# Patient Record
Sex: MALE | Race: WHITE
[De-identification: names, ages, dates, MRNs, and addresses within clinical notes are randomized per-mention and may not be internally consistent; named-entity substitution may affect disease eponyms.]

---

## 2019-03-28 ENCOUNTER — HOSPITAL ENCOUNTER (OUTPATIENT)
Dept: HOSPITAL 25 - ED | Age: 64
Setting detail: OBSERVATION
LOS: 6 days | Discharge: HOME | DRG: 988 | End: 2019-04-03
Attending: INTERNAL MEDICINE | Admitting: HOSPITALIST
Payer: MEDICARE

## 2019-03-28 DIAGNOSIS — Z87.891: ICD-10-CM

## 2019-03-28 DIAGNOSIS — R04.2: ICD-10-CM

## 2019-03-28 DIAGNOSIS — I10: ICD-10-CM

## 2019-03-28 DIAGNOSIS — Z90.49: ICD-10-CM

## 2019-03-28 DIAGNOSIS — E11.22: ICD-10-CM

## 2019-03-28 DIAGNOSIS — T50.8X5A: ICD-10-CM

## 2019-03-28 DIAGNOSIS — M31.31: ICD-10-CM

## 2019-03-28 DIAGNOSIS — M31.0: Primary | ICD-10-CM

## 2019-03-28 DIAGNOSIS — E78.5: ICD-10-CM

## 2019-03-28 DIAGNOSIS — K74.60: ICD-10-CM

## 2019-03-28 DIAGNOSIS — I25.2: ICD-10-CM

## 2019-03-28 DIAGNOSIS — M10.9: ICD-10-CM

## 2019-03-28 DIAGNOSIS — Z72.89: ICD-10-CM

## 2019-03-28 DIAGNOSIS — Z81.8: ICD-10-CM

## 2019-03-28 DIAGNOSIS — I25.10: ICD-10-CM

## 2019-03-28 DIAGNOSIS — R91.8: ICD-10-CM

## 2019-03-28 DIAGNOSIS — E11.42: ICD-10-CM

## 2019-03-28 DIAGNOSIS — I12.9: ICD-10-CM

## 2019-03-28 DIAGNOSIS — N18.9: ICD-10-CM

## 2019-03-28 DIAGNOSIS — Z79.4: ICD-10-CM

## 2019-03-28 DIAGNOSIS — E66.01: ICD-10-CM

## 2019-03-28 DIAGNOSIS — E78.00: ICD-10-CM

## 2019-03-28 DIAGNOSIS — Z95.5: ICD-10-CM
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
## 2019-03-28 DIAGNOSIS — Z82.49: ICD-10-CM

## 2019-03-28 DIAGNOSIS — Z88.8: ICD-10-CM

## 2019-03-28 LAB
ALBUMIN SERPL BCG-MCNC: 4.1 G/DL (ref 3.2–5.2)
ALBUMIN/GLOB SERPL: 1.4 {RATIO} (ref 1–3)
ALP SERPL-CCNC: 26 U/L (ref 34–104)
ALT SERPL W P-5'-P-CCNC: 35 U/L (ref 7–52)
ANION GAP SERPL CALC-SCNC: 9 MMOL/L (ref 2–11)
APAP SERPL-MCNC: < 15 MCG/ML
AST SERPL-CCNC: 51 U/L (ref 13–39)
BASOPHILS # BLD AUTO: 0 10^3/UL (ref 0–0.2)
BUN SERPL-MCNC: 53 MG/DL (ref 6–24)
BUN/CREAT SERPL: 14.5 (ref 8–20)
CALCIUM SERPL-MCNC: 9.3 MG/DL (ref 8.6–10.3)
CHLORIDE SERPL-SCNC: 109 MMOL/L (ref 101–111)
CK SERPL-CCNC: 213 U/L (ref 10–223)
EOSINOPHIL # BLD AUTO: 0.1 10^3/UL (ref 0–0.6)
GLOBULIN SER CALC-MCNC: 2.9 G/DL (ref 2–4)
GLUCOSE SERPL-MCNC: 142 MG/DL (ref 70–100)
HCO3 SERPL-SCNC: 23 MMOL/L (ref 22–32)
HCT VFR BLD AUTO: 41 % (ref 36–46)
HGB BLD-MCNC: 14.1 G/DL (ref 14–18)
INR PPP/BLD: 1.08 (ref 0.77–1.02)
LDH SERPL L TO P-CCNC: 222 U/L (ref 140–271)
LYMPHOCYTES # BLD AUTO: 0.9 10^3/UL (ref 1–4.8)
MAGNESIUM SERPL-MCNC: 1.7 MG/DL (ref 1.9–2.7)
MCH RBC QN AUTO: 32 PG (ref 27–31)
MCHC RBC AUTO-ENTMCNC: 34 G/DL (ref 31–36)
MCV RBC AUTO: 93 FL (ref 80–94)
MONOCYTES # BLD AUTO: 0.8 10^3/UL (ref 0–0.8)
NEUTROPHILS # BLD AUTO: 3.5 10^3/UL (ref 1.5–7.7)
NRBC # BLD AUTO: 0 10^3/UL
NRBC BLD QL AUTO: 0.2
PLATELET # BLD AUTO: 149 10^3/UL (ref 150–450)
POTASSIUM SERPL-SCNC: 3.9 MMOL/L (ref 3.5–5)
PROT SERPL-MCNC: 7 G/DL (ref 6.4–8.9)
RBC # BLD AUTO: 4.46 10^6 /UL (ref 4.18–5.48)
SALICYLATES SERPL-MCNC: < 2.5 MG/DL (ref ?–30)
SODIUM SERPL-SCNC: 141 MMOL/L (ref 135–145)
TROPONIN I SERPL-MCNC: 0.02 NG/ML (ref ?–0.04)
TSH SERPL-ACNC: 1.61 MCIU/ML (ref 0.34–5.6)
WBC # BLD AUTO: 5.4 10^3/UL (ref 3.5–10.8)
WBC UR QL AUTO: (no result)

## 2019-03-28 PROCEDURE — 71046 X-RAY EXAM CHEST 2 VIEWS: CPT

## 2019-03-28 PROCEDURE — 88305 TISSUE EXAM BY PATHOLOGIST: CPT

## 2019-03-28 PROCEDURE — 82785 ASSAY OF IGE: CPT

## 2019-03-28 PROCEDURE — 87116 MYCOBACTERIA CULTURE: CPT

## 2019-03-28 PROCEDURE — 81003 URINALYSIS AUTO W/O SCOPE: CPT

## 2019-03-28 PROCEDURE — 80307 DRUG TEST PRSMV CHEM ANLYZR: CPT

## 2019-03-28 PROCEDURE — 87070 CULTURE OTHR SPECIMN AEROBIC: CPT

## 2019-03-28 PROCEDURE — 86803 HEPATITIS C AB TEST: CPT

## 2019-03-28 PROCEDURE — 81015 MICROSCOPIC EXAM OF URINE: CPT

## 2019-03-28 PROCEDURE — 71045 X-RAY EXAM CHEST 1 VIEW: CPT

## 2019-03-28 PROCEDURE — 82570 ASSAY OF URINE CREATININE: CPT

## 2019-03-28 PROCEDURE — 76000 FLUOROSCOPY <1 HR PHYS/QHP: CPT

## 2019-03-28 PROCEDURE — 76775 US EXAM ABDO BACK WALL LIM: CPT

## 2019-03-28 PROCEDURE — 82164 ANGIOTENSIN I ENZYME TEST: CPT

## 2019-03-28 PROCEDURE — 84156 ASSAY OF PROTEIN URINE: CPT

## 2019-03-28 PROCEDURE — 83615 LACTATE (LD) (LDH) ENZYME: CPT

## 2019-03-28 PROCEDURE — 84443 ASSAY THYROID STIM HORMONE: CPT

## 2019-03-28 PROCEDURE — 93005 ELECTROCARDIOGRAM TRACING: CPT

## 2019-03-28 PROCEDURE — 85610 PROTHROMBIN TIME: CPT

## 2019-03-28 PROCEDURE — 80053 COMPREHEN METABOLIC PANEL: CPT

## 2019-03-28 PROCEDURE — 84540 ASSAY OF URINE/UREA-N: CPT

## 2019-03-28 PROCEDURE — 84165 PROTEIN E-PHORESIS SERUM: CPT

## 2019-03-28 PROCEDURE — 83516 IMMUNOASSAY NONANTIBODY: CPT

## 2019-03-28 PROCEDURE — 86225 DNA ANTIBODY NATIVE: CPT

## 2019-03-28 PROCEDURE — 83880 ASSAY OF NATRIURETIC PEPTIDE: CPT

## 2019-03-28 PROCEDURE — 86431 RHEUMATOID FACTOR QUANT: CPT

## 2019-03-28 PROCEDURE — 83883 ASSAY NEPHELOMETRY NOT SPEC: CPT

## 2019-03-28 PROCEDURE — 89190 NASAL SMEAR FOR EOSINOPHILS: CPT

## 2019-03-28 PROCEDURE — 87077 CULTURE AEROBIC IDENTIFY: CPT

## 2019-03-28 PROCEDURE — G0480 DRUG TEST DEF 1-7 CLASSES: HCPCS

## 2019-03-28 PROCEDURE — 36415 COLL VENOUS BLD VENIPUNCTURE: CPT

## 2019-03-28 PROCEDURE — 87015 SPECIMEN INFECT AGNT CONCNTJ: CPT

## 2019-03-28 PROCEDURE — 83735 ASSAY OF MAGNESIUM: CPT

## 2019-03-28 PROCEDURE — 87340 HEPATITIS B SURFACE AG IA: CPT

## 2019-03-28 PROCEDURE — 87086 URINE CULTURE/COLONY COUNT: CPT

## 2019-03-28 PROCEDURE — 84166 PROTEIN E-PHORESIS/URINE/CSF: CPT

## 2019-03-28 PROCEDURE — 94640 AIRWAY INHALATION TREATMENT: CPT

## 2019-03-28 PROCEDURE — 99284 EMERGENCY DEPT VISIT MOD MDM: CPT

## 2019-03-28 PROCEDURE — 83520 IMMUNOASSAY QUANT NOS NONAB: CPT

## 2019-03-28 PROCEDURE — 71250 CT THORAX DX C-: CPT

## 2019-03-28 PROCEDURE — 80076 HEPATIC FUNCTION PANEL: CPT

## 2019-03-28 PROCEDURE — 84300 ASSAY OF URINE SODIUM: CPT

## 2019-03-28 PROCEDURE — 82043 UR ALBUMIN QUANTITATIVE: CPT

## 2019-03-28 PROCEDURE — 80329 ANALGESICS NON-OPIOID 1 OR 2: CPT

## 2019-03-28 PROCEDURE — 88112 CYTOPATH CELL ENHANCE TECH: CPT

## 2019-03-28 PROCEDURE — 86335 IMMUNFIX E-PHORSIS/URINE/CSF: CPT

## 2019-03-28 PROCEDURE — 84155 ASSAY OF PROTEIN SERUM: CPT

## 2019-03-28 PROCEDURE — 84484 ASSAY OF TROPONIN QUANT: CPT

## 2019-03-28 PROCEDURE — 80048 BASIC METABOLIC PNL TOTAL CA: CPT

## 2019-03-28 PROCEDURE — 82550 ASSAY OF CK (CPK): CPT

## 2019-03-28 PROCEDURE — 87205 SMEAR GRAM STAIN: CPT

## 2019-03-28 PROCEDURE — 87206 SMEAR FLUORESCENT/ACID STAI: CPT

## 2019-03-28 PROCEDURE — 86140 C-REACTIVE PROTEIN: CPT

## 2019-03-28 PROCEDURE — 87102 FUNGUS ISOLATION CULTURE: CPT

## 2019-03-28 PROCEDURE — 83605 ASSAY OF LACTIC ACID: CPT

## 2019-03-28 PROCEDURE — 82595 ASSAY OF CRYOGLOBULIN: CPT

## 2019-03-28 PROCEDURE — 86038 ANTINUCLEAR ANTIBODIES: CPT

## 2019-03-28 PROCEDURE — 87186 SC STD MICRODIL/AGAR DIL: CPT

## 2019-03-28 PROCEDURE — 85652 RBC SED RATE AUTOMATED: CPT

## 2019-03-28 PROCEDURE — G0378 HOSPITAL OBSERVATION PER HR: HCPCS

## 2019-03-28 PROCEDURE — 80320 DRUG SCREEN QUANTALCOHOLS: CPT

## 2019-03-28 PROCEDURE — 82784 ASSAY IGA/IGD/IGG/IGM EACH: CPT

## 2019-03-28 PROCEDURE — 85025 COMPLETE CBC W/AUTO DIFF WBC: CPT

## 2019-03-28 NOTE — ED
Shortness of Breath





- HPI Summary


HPI Summary: 





Patient complains of intermittent mild SOB with exertion, moderate productive 

cough with some blood in it intermittently x 2 days.  Also complains of 

increased urinary frequency, with decreased volume starting today.  Patient 

also complains of one episode of minor chest pain 2 days ago with no subsequent 

chest pain since.  No active CP, SOB here in the ED.  Denies fever, sore throat

, HA, neck stiffness, N/V/D, abdominal pain, change in BM.  Medical history is 

HTN, HDL, cirrhosis, cardiac stents placed in 2005, DM.  Former smoker, quit 19 

years ago.  History of negative nuclear stress test yesterday for abnormal EKG.

  No anti-coag. 





- History of Current Complaint


Chief Complaint: EDShortnessOfBreath


Time Seen by Provider: 03/28/19 18:06


Hx Obtained From: Patient


Onset/Duration: Gradual Onset


Timing: Constant


Current Severity: Mild


Dyspnea At: Exertion


Associated Signs & Symptoms: Cough (Productive), Cough (Bloody Sputum)





- Allergy/Home Medications


Allergies/Adverse Reactions: 


 Allergies











Allergy/AdvReac Type Severity Reaction Status Date / Time


 


lisinopril AdvReac Intermediate Dizziness Verified 03/27/19 13:33














PMH/Surg Hx/FS Hx/Imm Hx


Endocrine/Hematology History: Reports: Hx Diabetes


Cardiovascular History: Reports: Hx Angina, Hx Coronary Artery Disease, Hx 

Hypercholesterolemia, Hx Hypertension, Hx Myocardial Infarction


   Denies: Hx Pacemaker/ICD, Hx Valvular Heart Disease


Respiratory History: 


   Denies: Hx Asthma, Hx Chronic Obstructive Pulmonary Disease (COPD)


 History: 


   Denies: Hx Chronic Renal Failure, Hx Renal Disease


Sensory History: Reports: Hx Contacts or Glasses - GLASSES


   Denies: Hx Hearing Aid


Opthamlomology History: Reports: Hx Contacts or Glasses - GLASSES


Neurological History: Reports: Other Neuro Impairments/Disorders - PERIPHERAL 

NEUROPATHY FEET


Psychiatric History: 


   Denies: Hx Panic Disorder





- Cancer History


Cancer Type, Location and Year: basal cell CHEST removed,


Hx Chemotherapy: No


Hx Radiation Therapy: No





- Surgical History


Surgery Procedure, Year, and Place: hernia repair, cardiac stent-CORDIS CONSTANCE 

2005: 3T SAFE, BONE RESET, VASECTOMY


Hx Anesthesia Reactions: No





- Immunization History


Date of Influenza Vaccine: 09/2018


Immunizations Up to Date: Yes


Infectious Disease History: No


Infectious Disease History: 


   Denies: Traveled Outside the [place] in Last 30 Days





- Social History


Alcohol Use: Weekly


Substance Use Type: Reports: None


Substance Use Comment - Amount & Last Used: QUIT DRINKING 1/11/16


Smoking Status (MU): Former Smoker


Length of Time of Smoking/Using Tobacco: 30 YRS


Have You Smoked in the Last Year: No





Review of Systems


Constitutional: Negative


Eyes: Negative


ENT: Negative


Positive: Chest Pain


Positive: Shortness Of Breath, Cough


Gastrointestinal: Negative


Positive: frequency


Musculoskeletal: Negative


Skin: Negative


Neurological: Negative


Psychological: Normal


All Other Systems Reviewed And Are Negative: Yes





Physical Exam


Triage Information Reviewed: Yes


Vital Signs On Initial Exam: 


 Initial Vitals











Temp Pulse Resp BP Pulse Ox


 


 99.1 F   93   20   148/86   93 


 


 03/28/19 17:03  03/28/19 17:03  03/28/19 17:03  03/28/19 17:03  03/28/19 17:03











Vital Signs Reviewed: Yes


Appearance: Positive: Well-Appearing


Skin: Positive: Warm


Head/Face: Positive: Normal Head/Face Inspection


Eyes: Positive: Normal


Neck: Positive: Supple


Respiratory/Lung Sounds: Positive: Clear to Auscultation


Cardiovascular: Positive: Normal


Abdomen Description: Positive: Nontender


Musculoskeletal: Positive: Normal


Neurological: Positive: Normal


Psychiatric: Positive: Normal


AVPU Assessment: Alert





- Konrad Coma Scale


Best Eye Response: 4 - Spontaneous


Best Motor Response: 6 - Obeys Commands


Best Verbal Response: 5 - Oriented


Coma Scale Total: 15





Diagnostics





- Vital Signs


 Vital Signs











  Temp Pulse Resp BP Pulse Ox


 


 03/28/19 18:18   87  21  158/94  94


 


 03/28/19 17:48   87  20  163/94  90


 


 03/28/19 17:44   85  21   92


 


 03/28/19 17:03  99.1 F  93  20  148/86  93














- Laboratory


Lab Results: 


 Lab Results











  03/28/19 03/28/19 03/28/19 Range/Units





  18:28 18:28 18:28 


 


WBC  5.4    (3.5-10.8)  10^3/uL


 


RBC  4.46    (4.18-5.48)  10^6 /uL


 


Hgb  14.1    (14.0-18.0)  g/dL


 


Hct  41    (36-46)  %


 


MCV  93    (80-94)  fL


 


MCH  32 H    (27-31)  pg


 


MCHC  34    (31-36)  g/dL


 


RDW  15    (10.5-15)  %


 


Plt Count  149 L    (150-450)  10^3/uL


 


MPV  9.3    (7.4-10.4)  fL


 


Neut % (Auto)  65.8    %


 


Lymph % (Auto)  17.3    %


 


Mono % (Auto)  15.1    %


 


Eos % (Auto)  1.1    %


 


Baso % (Auto)  0.7    %


 


Absolute Neuts (auto)  3.5    (1.5-7.7)  10^3/ul


 


Absolute Lymphs (auto)  0.9 L    (1.0-4.8)  10^3/ul


 


Absolute Monos (auto)  0.8    (0-0.8)  10^3/ul


 


Absolute Eos (auto)  0.1    (0-0.6)  10^3/ul


 


Absolute Basos (auto)  0    (0-0.2)  10^3/ul


 


Absolute Nucleated RBC  0    10^3/ul


 


Nucleated RBC %  0.2    


 


INR (Anticoag Therapy)     (0.77-1.02)  


 


Sodium   141   (135-145)  mmol/L


 


Potassium   3.9   (3.5-5.0)  mmol/L


 


Chloride   109   (101-111)  mmol/L


 


Carbon Dioxide   23   (22-32)  mmol/L


 


Anion Gap   9   (2-11)  mmol/L


 


BUN   53 H   (6-24)  mg/dL


 


Creatinine   3.66 H   (0.67-1.17)  mg/dL


 


Est GFR ( Amer)   20.3   (>60)  


 


Est GFR (Non-Af Amer)   16.8   (>60)  


 


BUN/Creatinine Ratio   14.5   (8-20)  


 


Glucose   142 H   ()  mg/dL


 


Lactic Acid    0.5  (0.5-2.0)  mmol/L


 


Calcium   9.3   (8.6-10.3)  mg/dL


 


Magnesium   1.7 L   (1.9-2.7)  mg/dL


 


Total Bilirubin   0.70   (0.2-1.0)  mg/dL


 


AST   51 H   (13-39)  U/L


 


ALT   35   (7-52)  U/L


 


Alkaline Phosphatase   26 L   ()  U/L


 


Troponin I   0.02   (<0.04)  ng/mL


 


B-Natriuretic Peptide     (<=100)  pg/mL


 


Total Protein   7.0   (6.4-8.9)  g/dL


 


Albumin   4.1   (3.2-5.2)  g/dL


 


Globulin   2.9   (2-4)  g/dL


 


Albumin/Globulin Ratio   1.4   (1-3)  


 


TSH   1.61   (0.34-5.60)  mcIU/mL














  03/28/19 03/28/19 Range/Units





  18:28 18:28 


 


WBC    (3.5-10.8)  10^3/uL


 


RBC    (4.18-5.48)  10^6 /uL


 


Hgb    (14.0-18.0)  g/dL


 


Hct    (36-46)  %


 


MCV    (80-94)  fL


 


MCH    (27-31)  pg


 


MCHC    (31-36)  g/dL


 


RDW    (10.5-15)  %


 


Plt Count    (150-450)  10^3/uL


 


MPV    (7.4-10.4)  fL


 


Neut % (Auto)    %


 


Lymph % (Auto)    %


 


Mono % (Auto)    %


 


Eos % (Auto)    %


 


Baso % (Auto)    %


 


Absolute Neuts (auto)    (1.5-7.7)  10^3/ul


 


Absolute Lymphs (auto)    (1.0-4.8)  10^3/ul


 


Absolute Monos (auto)    (0-0.8)  10^3/ul


 


Absolute Eos (auto)    (0-0.6)  10^3/ul


 


Absolute Basos (auto)    (0-0.2)  10^3/ul


 


Absolute Nucleated RBC    10^3/ul


 


Nucleated RBC %    


 


INR (Anticoag Therapy)  1.08 H   (0.77-1.02)  


 


Sodium    (135-145)  mmol/L


 


Potassium    (3.5-5.0)  mmol/L


 


Chloride    (101-111)  mmol/L


 


Carbon Dioxide    (22-32)  mmol/L


 


Anion Gap    (2-11)  mmol/L


 


BUN    (6-24)  mg/dL


 


Creatinine    (0.67-1.17)  mg/dL


 


Est GFR ( Amer)    (>60)  


 


Est GFR (Non-Af Amer)    (>60)  


 


BUN/Creatinine Ratio    (8-20)  


 


Glucose    ()  mg/dL


 


Lactic Acid    (0.5-2.0)  mmol/L


 


Calcium    (8.6-10.3)  mg/dL


 


Magnesium    (1.9-2.7)  mg/dL


 


Total Bilirubin    (0.2-1.0)  mg/dL


 


AST    (13-39)  U/L


 


ALT    (7-52)  U/L


 


Alkaline Phosphatase    ()  U/L


 


Troponin I    (<0.04)  ng/mL


 


B-Natriuretic Peptide   383 H  (<=100)  pg/mL


 


Total Protein    (6.4-8.9)  g/dL


 


Albumin    (3.2-5.2)  g/dL


 


Globulin    (2-4)  g/dL


 


Albumin/Globulin Ratio    (1-3)  


 


TSH    (0.34-5.60)  mcIU/mL











Result Diagrams: 


 03/28/19 18:28





 03/28/19 18:28


Lab Statement: Any lab studies that have been ordered have been reviewed, and 

results considered in the medical decision making process.





Course/Dx





- Course


Course Of Treatment: Patient complains of intermittent mild SOB with exertion, 

moderate productive cough with some blood in it intermittently x 2 days.  Also 

complains of increased urinary frequency, with decreased volume starting today.

  Patient also complains of one episode of minor chest pain 2 days ago with no 

subsequent chest pain since.  No active CP, SOB here in the ED.  Denies fever, 

sore throat, HA, neck stiffness, N/V/D, abdominal pain, change in BM.  Medical 

history is HTN, HDL, cirrhosis, cardiac stents placed in 2005, DM.  Former 

smoker, quit 19 years ago.  History of negative nuclear stress test yesterday 

for abnormal EKG.  No anti-coag.  Physical exam unremarkable.  Vital signs 

within normal limits.  EKG sinus rhythm.  Chest x-ray read by both attending 

Dr. Gutierrez and MARJ, with possible consolidation and effusion in right lower lobe, 

no prior CXR on file to compare.  CR 3.66, significant elevation from normal 

creatinine on 3/18/19.  BUN 53.  , no prior BNP to compare.  Labs 

otherwise unremarkable.  UA normal. Postvoid residual 0ml by bladder scan.





- Diagnoses


Provider Diagnoses: 


 Acute renal insufficiency








Discharge





- Sign-Out/Discharge


Documenting (check all that apply): Patient Departure


Patient Received Moderate/Deep Sedation with Procedure: No





- Discharge Plan


Condition: Stable


Disposition: ADMITTED TO [facility]


Referrals: 


Haris Hernandez MD [Primary Care Provider] - 





- Billing Disposition and Condition


Condition: STABLE


Disposition: Admitted to [facility]

## 2019-03-29 LAB
ANION GAP SERPL CALC-SCNC: 10 MMOL/L (ref 2–11)
BASOPHILS # BLD AUTO: 0 10^3/UL (ref 0–0.2)
BUN SERPL-MCNC: 52 MG/DL (ref 6–24)
BUN/CREAT SERPL: 15.4 (ref 8–20)
CALCIUM SERPL-MCNC: 9.3 MG/DL (ref 8.6–10.3)
CHLORIDE SERPL-SCNC: 110 MMOL/L (ref 101–111)
CREAT UR-MCNC: 76.87 MG/DL
CREAT UR-MCNC: 76.87 MG/DL
CREAT UR-MCNC: 80.03 MG/DL
EOSINOPHIL # BLD AUTO: 0.1 10^3/UL (ref 0–0.6)
FRACT EXCRET NA UR+SERPL-RTO: 1.66 %
FRACT EXCRET NA UR+SERPL-RTO: 1.79 %
GLUCOSE SERPL-MCNC: 131 MG/DL (ref 70–100)
HCO3 SERPL-SCNC: 22 MMOL/L (ref 22–32)
HCT VFR BLD AUTO: 41 % (ref 36–46)
HGB BLD-MCNC: 13.8 G/DL (ref 14–18)
LYMPHOCYTES # BLD AUTO: 0.7 10^3/UL (ref 1–4.8)
MCH RBC QN AUTO: 31 PG (ref 27–31)
MCHC RBC AUTO-ENTMCNC: 34 G/DL (ref 31–36)
MCV RBC AUTO: 93 FL (ref 80–94)
MICROALBUMIN UR DL<=1MG/L-MCNC: 86.1 MG/L
MICROALBUMIN/CREAT UR: 112 MG/G{CREAT} (ref ?–31)
MONOCYTES # BLD AUTO: 0.6 10^3/UL (ref 0–0.8)
NEUTROPHILS # BLD AUTO: 3.7 10^3/UL (ref 1.5–7.7)
NRBC # BLD AUTO: 0 10^3/UL
NRBC BLD QL AUTO: 0
PLATELET # BLD AUTO: 147 10^3/UL (ref 150–450)
POTASSIUM SERPL-SCNC: 3.7 MMOL/L (ref 3.5–5)
RBC # BLD AUTO: 4.43 10^6 /UL (ref 4.18–5.48)
RBC UR QL AUTO: (no result)
SODIUM SERPL-SCNC: 142 MMOL/L (ref 135–145)
SODIUM UR-SCNC: 56 MMOL/L
SODIUM UR-SCNC: 58 MMOL/L
SODIUM UR-SCNC: 58 MMOL/L
UUN UR-MCNC: 629 MG/DL
WBC # BLD AUTO: 5.2 10^3/UL (ref 3.5–10.8)
WBC UR QL AUTO: (no result)

## 2019-03-29 RX ADMIN — INSULIN LISPRO SCH UNITS: 100 INJECTION, SOLUTION INTRAVENOUS; SUBCUTANEOUS at 18:30

## 2019-03-29 RX ADMIN — CARVEDILOL SCH MG: 25 TABLET, FILM COATED ORAL at 08:21

## 2019-03-29 RX ADMIN — AMLODIPINE BESYLATE SCH MG: 5 TABLET ORAL at 08:20

## 2019-03-29 RX ADMIN — ATORVASTATIN CALCIUM SCH: 40 TABLET, FILM COATED ORAL at 08:23

## 2019-03-29 RX ADMIN — AMLODIPINE BESYLATE SCH MG: 5 TABLET ORAL at 02:50

## 2019-03-29 RX ADMIN — CARVEDILOL SCH MG: 25 TABLET, FILM COATED ORAL at 21:45

## 2019-03-29 RX ADMIN — AMLODIPINE BESYLATE SCH MG: 5 TABLET ORAL at 21:45

## 2019-03-29 RX ADMIN — ATORVASTATIN CALCIUM SCH MG: 40 TABLET, FILM COATED ORAL at 08:20

## 2019-03-29 RX ADMIN — INSULIN GLARGINE SCH UNITS: 100 INJECTION, SOLUTION SUBCUTANEOUS at 08:22

## 2019-03-29 RX ADMIN — INSULIN LISPRO SCH UNITS: 100 INJECTION, SOLUTION INTRAVENOUS; SUBCUTANEOUS at 13:28

## 2019-03-29 RX ADMIN — CARVEDILOL SCH MG: 25 TABLET, FILM COATED ORAL at 02:50

## 2019-03-29 RX ADMIN — INSULIN LISPRO SCH UNITS: 100 INJECTION, SOLUTION INTRAVENOUS; SUBCUTANEOUS at 08:21

## 2019-03-29 RX ADMIN — GABAPENTIN SCH MG: 400 CAPSULE ORAL at 21:44

## 2019-03-29 RX ADMIN — GABAPENTIN SCH MG: 300 CAPSULE ORAL at 02:50

## 2019-03-29 RX ADMIN — ASPIRIN SCH MG: 81 TABLET, COATED ORAL at 08:21

## 2019-03-29 NOTE — PN
Subjective





- Subjective


Reason for Note: Progress Note


History: 





3/25/2019 Pedro Glass had a planned myoview  rest component of a NM stress 

test.  He was administered Technetium 99M tetrafosmin IV. Since he had caffeine 

that day, the stress portion was delayed for 2 days.  That evening he developed 

mild sore throat, a cough.  By 3/26/2019 he was coughing up some pink sputum.  

He also noticed his urine turned brown.  On 3/27/2019 he had the lexiscan 

portion of the  stress test  he was given regadenoson IV.  He developed 

worsening shortness  of breath, chest tightness, shortnes of breath, hemoptysis 

and reduced volumes of urine without obstruction.  His son encouraged him to 

come to the ED.   He has not slept well for 3 nights.  His appetite is very 

poor.  He has had no fever/sweats or chills.  He was in good health prior to 

the test.  


This morning  he is fatigued, he continues to cough up pink sputum.  He states 

he feels "terrible".


Active Problems: 


 Active Problems





Acute renal failure (Acute) 


Adverse drug effect (Acute) T50.905A


Hemoptysis (Acute) R04.2


Pulmonary-renal syndrome (Acute) M31.0


Atherosclerosis (Chronic) I70.90


BMI over 35 (Chronic) [id number]


Diabetic neuropathy (Chronic) E11.40


Essential hypertension (Chronic) I10


Hyperlipidemia (Chronic) E78.5


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


Type 2 diabetes mellitus (Chronic) 








Current Medications: 


 Current Medications





Acetaminophen (Tylenol Tab*)  650 mg PO Q6H PRN


   PRN Reason: pain/fever


Amlodipine Besylate (Norvasc Tab*)  5 mg PO BID [facility]


   Last Admin: 19 08:20 Dose:  5 mg


Aspirin (Aspirin Ec Tab*)  81 mg PO DAILY [facility]


   Last Admin: 19 08:21 Dose:  81 mg


Atorvastatin Calcium (Lipitor*)  40 mg PO DAILY [facility]; Protocol


   Last Admin: 19 08:23 Dose:  Not Given


Carvedilol (Coreg Tab*)  25 mg PO BID [facility]


   Last Admin: 19 08:21 Dose:  25 mg


Gabapentin (Neurontin Cap(*))  400 mg PO BEDTIME [facility]


Gabapentin (Neurontin Cap(*))  300 mg PO QAM [facility]


   Last Admin: 19 02:50 Dose:  300 mg


Hydralazine HCl (Apresoline Iv*)  5 mg IV SLOW PU Q6H PRN


   PRN Reason: SBP>180


Sodium Chloride (Ns 0.9% 1000 Ml**)  1,000 mls @ 75 mls/hr IV PER RATE [facility]


   Last Admin: 19 00:31 Dose:  75 mls/hr


Insulin Glargine (Lantus(*))  30 units SUBCUT DAILY [facility]


   Last Admin: 19 08:22 Dose:  30 units


Insulin Human Lispro (Humalog*)  10 units SUBCUT AC [facility]


   Last Admin: 19 08:21 Dose:  10 units


Meclizine HCl (Antivert Tab*)  25 mg PO TID PRN


   PRN Reason: VERTIGO


Prochlorperazine Edisylate (Compazine Inj*)  5 mg IV Q6H PRN


   PRN Reason: NAUSEA/VOMITING











- Review of Systems


Constitutional Symptoms: Yes: Fatigue, No: Fever, Night Sweats


Dermatology: Rash: No


Pulmonary: Positive: Cough, Sputum, Hemoptysis, Respiratory Distress, Shortness 

of Breath


Cardiology: Positive: Chest Pain


   Negative: Palpitations, Swelling of Ankles


Gastroenterology: Positive: Anorexia, Constipation


   Negative: Abdominal Pain, Nausea, Vomiting


Genital - Urinary: Positive: Hematuria


Musculoskeletal: 


   Negative: Joint Pain


Neurology: 


   Negative: Headache, Numbness\Paresthesiae, Unexplained Weakness


Home Medications: 


 Home Medications











 Medication  Instructions  Recorded  Confirmed  Type


 


Gabapentin CAP(*) [Neurontin 300 1 tab PO QAM 16 History





CAP(*)]    


 


Gabapentin CAP(*) [Neurontin 300 2 tab PO BEDTIME 16 History





CAP(*)]    


 


Glipizide 10 mg PO QAM 16 History


 


Metformin HCl [Glucophage] 1 tab PO BID 16 History


 


Amlodipine Besylate [Amlodipine 5 mg PO BID 16 History





Besylate-]    


 


Meclizine TAB* [Antivert 12.5 TAB*] 25 mg PO TID PRN #15 tab 16 

Rx


 


Aspirin [Adult Aspirin Regimen] 1 tab PO DAILY 19 History


 


Carvedilol TAB* [Coreg TAB*] 25 mg PO BID 19 History


 


Empagliflozin [Jardiance] 10 mg PO DAILY 19 History


 


Fenofibrate Nanocrystallized 145 mg PO DAILY 19 History





[Tricor]    


 


Insulin Glargine,Hum.rec.anlog 40 unit SUBCUT DAILY 19 History





[Basaglar Kwikpen U-100]    


 


Insulin LISPRO* [HumaLOG*] 10 units SUBCUT AC 19 History


 


Losartan TAB* [Cozaar TAB*] 25 mg PO DAILY 19 History


 


Magnesium Oxide [Magnesium] 1 tab PO DAILY 19 History


 


Omega-3 Acid Ethyl Esters [Lovaza] 1 gm PO BID 19 History


 


Rosuvastatin Calcium [Crestor] 20 mg PO DAILY 19 History











Allergies: 


 Allergies











Allergy/AdvReac Type Severity Reaction Status Date / Time


 


lisinopril AdvReac Intermediate Dizziness Verified 19 13:33














Objective





- Vital Signs


Vital Signs: 


 Vital Signs











  19





  17:03 17:44 17:48


 


Temperature 99.1 F  


 


Pulse Rate 93 85 87


 


Respiratory 20 21 20





Rate   


 


Blood Pressure 148/86  163/94





(mmHg)   


 


O2 Sat by Pulse 93 92 90





Oximetry   














  19





  18:18 18:48 19:04


 


Temperature   


 


Pulse Rate 87 86 84


 


Respiratory 21 18 





Rate   


 


Blood Pressure 158/94 163/96 





(mmHg)   


 


O2 Sat by Pulse 94 89 93





Oximetry   














  19





  19:18 19:48 20:00


 


Temperature   


 


Pulse Rate 79 85 79


 


Respiratory 19 25 22





Rate   


 


Blood Pressure 159/88 174/101 





(mmHg)   


 


O2 Sat by Pulse 92 90 92





Oximetry   














  19





  20:18 20:48 21:00


 


Temperature   


 


Pulse Rate 84 85 88


 


Respiratory 22 20 30





Rate   


 


Blood Pressure 169/93 167/96 





(mmHg)   


 


O2 Sat by Pulse 92 94 91





Oximetry   














  19





  21:18 21:49 22:00


 


Temperature   


 


Pulse Rate 87 89 88


 


Respiratory 18 22 20





Rate   


 


Blood Pressure 159/93 132/63 





(mmHg)   


 


O2 Sat by Pulse 89 90 90





Oximetry   














  19





  22:19 22:35 23:00


 


Temperature  98.3 F 


 


Pulse Rate 87 87 


 


Respiratory 17 20 17





Rate   


 


Blood Pressure 159/96 176/94 





(mmHg)   


 


O2 Sat by Pulse 91 93 





Oximetry   














  19





  23:05 02:40 02:50


 


Temperature 99.4 F  


 


Pulse Rate 87 85 


 


Respiratory 19  20





Rate   


 


Blood Pressure 159/96 170/80 





(mmHg)   


 


O2 Sat by Pulse 92  





Oximetry   














  19





  03:58 04:00 05:00


 


Temperature 98.0 F  


 


Pulse Rate 76  


 


Respiratory 16  20





Rate   


 


Blood Pressure 148/70  





(mmHg)   


 


O2 Sat by Pulse 89 91 





Oximetry   














  19





  06:57 07:22


 


Temperature  98.1 F


 


Pulse Rate  83


 


Respiratory 20 16





Rate  


 


Blood Pressure  145/80





(mmHg)  


 


O2 Sat by Pulse  92





Oximetry  














- Intake and Output


Intake and Output: 


 Intake & Output











 19





 11:59 11:59 11:59 11:59


 


Intake Total    1000


 


Output Total    550


 


Balance    450


 


Weight    248 lb 12.8 oz


 


Intake:    


 


  IV Fluids    1000


 


  Oral    0


 


Output:    


 


  Urine    550


 


Other:    


 


  Estimated Void    Medium


 


  # Voids    1





 





ADLs: Meal  Record                                         Start:  19 22:

35


Freq:   DAILY@0900,1400,1800                               Status: Active      

  


Protocol:                                                                      

  


 Created      19 22:35  System  (Rec: 19 22:35  System  [id number])


Intake and Output                                          Start:  19 17:

05


Freq:                                                      Status: Cancelled   

  


Protocol:                                                                      

  


 Created      19 17:05  System  (Rec: 19 17:05  System  [id number])


Intake and Output                                          Start:  19 22:

35


Freq:   DAILY@0600,1400,2200                               Status: Cancelled   

  


Protocol:                                                                      

  


 Created      19 22:35  System  (Rec: 19 22:35  System  [id number])


Intake and Output                                          Start:  19 22:

34


Freq:   06,14,2200                                         Status: Active      

  


Protocol:                                                                      

  


 Created      19 22:36  [id number]  (Rec: 19 22:36  BKG  [id number])


 Document     19 06:00  [id number]  (Rec: 19 06:12  [id number]  [id number])











- Physical Exam


General Physical Exam Comment: He has pink sputum


General: No Cyanosis, No Anemia, No Jaundice, No Clubbing


Lungs and Chest: Yes: Chest Expansion Symetrica, Percussion Note Resonant, 

Vessicular Breath Sounds, Respiratory Distress - tachypnea.  No: Chest 

Expansion Full, Crackles, Wheezes


Heart Rate and Rhythm: Regular


Additional Cardiovascular: Yes: Normal Heart Sounds.  No: Heart Murmur, Pedal 

Edema


Abdominal Exam: Yes: Distention - obese, Soft, Bowel Sounds Present.  No: 

Abdominal Mass, Abdominal Tenderness





- Extremities


Cranial Nerves II-XII Intact: Yes


Limbs: Normal Power, Normal Tone





- Neuro


Orientation: A/O x3


Psychiatric: Anxious


Speech: Normal





Results





- Results


Lab Results: 


 Laboratory Results - last 24 hr











  19





  18:28 18:28 18:28


 


WBC  5.4  


 


RBC  4.46  


 


Hgb  14.1  


 


Hct  41  


 


MCV  93  


 


MCH  32 H  


 


MCHC  34  


 


RDW  15  


 


Plt Count  149 L  


 


MPV  9.3  


 


Neut % (Auto)  65.8  


 


Lymph % (Auto)  17.3  


 


Mono % (Auto)  15.1  


 


Eos % (Auto)  1.1  


 


Baso % (Auto)  0.7  


 


Absolute Neuts (auto)  3.5  


 


Absolute Lymphs (auto)  0.9 L  


 


Absolute Monos (auto)  0.8  


 


Absolute Eos (auto)  0.1  


 


Absolute Basos (auto)  0  


 


Absolute Nucleated RBC  0  


 


Nucleated RBC %  0.2  


 


INR (Anticoag Therapy)   


 


Sodium   141 


 


Potassium   3.9 


 


Chloride   109 


 


Carbon Dioxide   23 


 


Anion Gap   9 


 


BUN   53 H 


 


Creatinine   3.66 H 


 


Est GFR ( Amer)   20.3 


 


Est GFR (Non-Af Amer)   16.8 


 


BUN/Creatinine Ratio   14.5 


 


Glucose   142 H 


 


POC Glucose (mg/dL)   


 


Lactic Acid    0.5


 


Calcium   9.3 


 


Magnesium   1.7 L 


 


Total Bilirubin   0.70 


 


AST   51 H 


 


ALT   35 


 


Alkaline Phosphatase   26 L 


 


Lactate Dehydrogenase   222 


 


Total Creatine Kinase   213 


 


Troponin I   0.02 


 


B-Natriuretic Peptide   


 


Total Protein   7.0 


 


Albumin   4.1 


 


Globulin   2.9 


 


Albumin/Globulin Ratio   1.4 


 


TSH   1.61 


 


Urine Color   


 


Urine Appearance   


 


Urine pH   


 


Ur Specific Gravity   


 


Urine Protein   


 


Urine Ketones   


 


Urine Blood   


 


Urine Nitrate   


 


Urine Bilirubin   


 


Urine Urobilinogen   


 


Ur Leukocyte Esterase   


 


Urine WBC (Auto)   


 


Urine RBC (Auto)   


 


Ur Squamous Epith Cells   


 


Urine Bacteria   


 


Urine Glucose   


 


Salicylates   < 2.50 


 


Urine Opiates Screen   


 


Acetaminophen   < 15 


 


Ur Barbiturates Screen   


 


Ur Phencyclidine Scrn   


 


Ur Amphetamines Screen   


 


U Benzodiazepines Scrn   


 


Urine Cocaine Screen   


 


U Cannabinoids Screen   


 


Serum Alcohol   < 10 














  19





  18:28 18:28 20:38


 


WBC   


 


RBC   


 


Hgb   


 


Hct   


 


MCV   


 


MCH   


 


MCHC   


 


RDW   


 


Plt Count   


 


MPV   


 


Neut % (Auto)   


 


Lymph % (Auto)   


 


Mono % (Auto)   


 


Eos % (Auto)   


 


Baso % (Auto)   


 


Absolute Neuts (auto)   


 


Absolute Lymphs (auto)   


 


Absolute Monos (auto)   


 


Absolute Eos (auto)   


 


Absolute Basos (auto)   


 


Absolute Nucleated RBC   


 


Nucleated RBC %   


 


INR (Anticoag Therapy)  1.08 H  


 


Sodium   


 


Potassium   


 


Chloride   


 


Carbon Dioxide   


 


Anion Gap   


 


BUN   


 


Creatinine   


 


Est GFR ( Amer)   


 


Est GFR (Non-Af Amer)   


 


BUN/Creatinine Ratio   


 


Glucose   


 


POC Glucose (mg/dL)   


 


Lactic Acid   


 


Calcium   


 


Magnesium   


 


Total Bilirubin   


 


AST   


 


ALT   


 


Alkaline Phosphatase   


 


Lactate Dehydrogenase   


 


Total Creatine Kinase   


 


Troponin I   


 


B-Natriuretic Peptide   383 H 


 


Total Protein   


 


Albumin   


 


Globulin   


 


Albumin/Globulin Ratio   


 


TSH   


 


Urine Color    Yellow


 


Urine Appearance    Clear


 


Urine pH    6.0


 


Ur Specific [place]    1.011


 


Urine Protein    1+(30 mg/dl) A


 


Urine Ketones    Negative


 


Urine Blood    2+ A


 


Urine Nitrate    Negative


 


Urine Bilirubin    Negative


 


Urine Urobilinogen    Negative


 


Ur Leukocyte Esterase    Negative


 


Urine WBC (Auto)    Trace(0-5/hpf)


 


Urine RBC (Auto)    Absent


 


Ur Squamous Epith Cells    Present A


 


Urine Bacteria    Absent


 


Urine Glucose    Negative


 


Salicylates   


 


Urine Opiates Screen   


 


Acetaminophen   


 


Ur Barbiturates Screen   


 


Ur Phencyclidine Scrn   


 


Ur Amphetamines Screen   


 


U Benzodiazepines Scrn   


 


Urine Cocaine Screen   


 


U Cannabinoids Screen   


 


Serum Alcohol   














  19





  21:38 22:39 02:20


 


WBC   


 


RBC   


 


Hgb   


 


Hct   


 


MCV   


 


MCH   


 


MCHC   


 


RDW   


 


Plt Count   


 


MPV   


 


Neut % (Auto)   


 


Lymph % (Auto)   


 


Mono % (Auto)   


 


Eos % (Auto)   


 


Baso % (Auto)   


 


Absolute Neuts (auto)   


 


Absolute Lymphs (auto)   


 


Absolute Monos (auto)   


 


Absolute Eos (auto)   


 


Absolute Basos (auto)   


 


Absolute Nucleated RBC   


 


Nucleated RBC %   


 


INR (Anticoag Therapy)   


 


Sodium   


 


Potassium   


 


Chloride   


 


Carbon Dioxide   


 


Anion Gap   


 


BUN   


 


Creatinine   


 


Est GFR ( Amer)   


 


Est GFR (Non-Af Amer)   


 


BUN/Creatinine Ratio   


 


Glucose   


 


POC Glucose (mg/dL)   


 


Lactic Acid   


 


Calcium   


 


Magnesium   


 


Total Bilirubin   


 


AST   


 


ALT   


 


Alkaline Phosphatase   


 


Lactate Dehydrogenase   


 


Total Creatine Kinase   


 


Troponin I  0.02   0.02


 


B-Natriuretic Peptide   


 


Total Protein   


 


Albumin   


 


Globulin   


 


Albumin/Globulin Ratio   


 


TSH   


 


Urine Color   


 


Urine Appearance   


 


Urine pH   


 


Ur Specific Gravity   


 


Urine Protein   


 


Urine Ketones   


 


Urine Blood   


 


Urine Nitrate   


 


Urine Bilirubin   


 


Urine Urobilinogen   


 


Ur Leukocyte Esterase   


 


Urine WBC (Auto)   


 


Urine RBC (Auto)   


 


Ur Squamous Epith Cells   


 


Urine Bacteria   


 


Urine Glucose   


 


Salicylates   


 


Urine Opiates Screen   None detected 


 


Acetaminophen   


 


Ur Barbiturates Screen   None detected 


 


Ur Phencyclidine Scrn   None detected 


 


Ur Amphetamines Screen   None detected 


 


U Benzodiazepines Scrn   None detected 


 


Urine Cocaine Screen   None detected 


 


U Cannabinoids Screen   None detected 


 


Serum Alcohol   














  19





  06:15 06:15 07:26


 


WBC  5.2  


 


RBC  4.43  


 


Hgb  13.8 L  


 


Hct  41  


 


MCV  93  


 


MCH  31  


 


MCHC  34  


 


RDW  15  


 


Plt Count  147 L  


 


MPV  9.5  


 


Neut % (Auto)  70.8  


 


Lymph % (Auto)  14.3  


 


Mono % (Auto)  11.7  


 


Eos % (Auto)  2.7  


 


Baso % (Auto)  0.5  


 


Absolute Neuts (auto)  3.7  


 


Absolute Lymphs (auto)  0.7 L  


 


Absolute Monos (auto)  0.6  


 


Absolute Eos (auto)  0.1  


 


Absolute Basos (auto)  0  


 


Absolute Nucleated RBC  0  


 


Nucleated RBC %  0  


 


INR (Anticoag Therapy)   


 


Sodium   142 


 


Potassium   3.7 


 


Chloride   110 


 


Carbon Dioxide   22 


 


Anion Gap   10 


 


BUN   52 H 


 


Creatinine   3.37 H 


 


Est GFR ( Amer)   22.3 


 


Est GFR (Non-Af Amer)   18.5 


 


BUN/Creatinine Ratio   15.4 


 


Glucose   131 H 


 


POC Glucose (mg/dL)    134 H


 


Lactic Acid   


 


Calcium   9.3 


 


Magnesium   


 


Total Bilirubin   


 


AST   


 


ALT   


 


Alkaline Phosphatase   


 


Lactate Dehydrogenase   


 


Total Creatine Kinase   


 


Troponin I   


 


B-Natriuretic Peptide   


 


Total Protein   


 


Albumin   


 


Globulin   


 


Albumin/Globulin Ratio   


 


TSH   


 


Urine Color   


 


Urine Appearance   


 


Urine pH   


 


Ur Specific Gravity   


 


Urine Protein   


 


Urine Ketones   


 


Urine Blood   


 


Urine Nitrate   


 


Urine Bilirubin   


 


Urine Urobilinogen   


 


Ur Leukocyte Esterase   


 


Urine WBC (Auto)   


 


Urine RBC (Auto)   


 


Ur Squamous Epith Cells   


 


Urine Bacteria   


 


Urine Glucose   


 


Salicylates   


 


Urine Opiates Screen   


 


Acetaminophen   


 


Ur Barbiturates Screen   


 


Ur Phencyclidine Scrn   


 


Ur Amphetamines Screen   


 


U Benzodiazepines Scrn   


 


Urine Cocaine Screen   


 


U Cannabinoids Screen   


 


Serum Alcohol   











Radiology Results: 





Patient Name:                    PEDRO GLASS                        

                                                                            

Medical Record#: [id number]


Ordering Physician: Gal Prabhakar MD                                       

                                                                            

Acct.#: [id number]


:         1955                    Age: 64   Sex: M                    

                                                                            

Location: IMAGING


Exam Date: 19                                                            

                                                                            ADM 

Status: REG REF


Order Information:                                               NM MYOCARDIAL 

MULTI RESTING; NUCLEAR CARDIAC STRESS TEST


Accession Number:                                                [id number]; 

[id number]


CPT:                                                             [id number]


Edited for charges.





Indication: Abnormal EKG.





Myocardial perfusion scan was performed utilizing 2-D protocol. Pharmacological 

stress was


applied and 25.5 mCi of technetium 99 and tetrofosmin was injected. Rest 

myocardial


perfusion was performed after intravenous injection of 25.4 mCi of technetium 

99m


tetrofosmin.





There is homogeneous distribution of the radiotracer throughout the left 

ventricle. There


is a moderate-sized fixed defect involving the inferior wall.





The ejection fraction at stress is 65% and at rest is 57%. Evaluation of wall 

motion


demonstrates no focal wall motion abnormality.





IMPRESSION: Photopenia in the inferior wall likely represents diaphragmatic 

attenuation.


No reversible change is noted.





Normal ejection fraction and normal wall motion.





ASSESSMENT:  Low risk





Based on imaging criteria from ACC/AHA 2002 Guideline Update for the Management 

of


Patients With Chronic Stable Angina Table 23. Noninvasive Risk Stratification.








____________________________________________________________


  


Dictated By: Zohreh Melton MD


Dictated Date/Time: 19 1519


Transcribed Date/Time: 19 1516


Copy to:








Patient Name:         PEDRO GLASS                                   

                        Medical Record#: [id number]


Ordering Physician: Ken JEAN-BAPTISTE                                             

                        Acct.#: [id number]


:     1955         Age: 64   Sex: M                                   

                        Location: [address] [place] [address]/TELEMETRY


Exam Date: 19                                                       

                        ADM Status: ADM Kory


Order Information:                         CHEST PA & LAT 2 VWS


Accession Number:                          [id number]


CPT:                                       [id number]


INDICATION:  Chest pain and shortness of breath.





COMPARISON:  There are no relevant prior studies available for comparison.





TECHNIQUE: Dual-energy PA  and lateral views of the chest were obtained.





FINDINGS:   The heart is upper limits of normal in size. There is diffuse 

prominence of


the interstitial markings and a more focal patchy infiltrate at the right lung 

base. There


is also a small right pleural effusion.





IMPRESSION:  FINDINGS MOST CONSISTENT WITH CONGESTIVE HEART FAILURE LESS LIKELY 

PNEUMONIA.





R2








Preliminary Imaging Read 


R2                                                                         





____________________________________________________________


<Electronically signed by Navdeep Ordonez MD in OV>  19


Dictated By: Navdeep Ordonez MD


Dictated Date/Time: 19


Transcribed Date/Time: 19


Copy to:











CC:Haris Hernandez MD; Savannah Mandel MD; Ken JEAN-BAPTISTE; Misty Palma MD


Imaging - [facility]                                 Imaging - [facility] 

[facility] 


[address]                                       [address] [place] [address]


ph ([phone])                                     ph ([phone])        

                                        ph ([phone]) 


Patient Name:         PEDRO GLASS                                   

                        Medical Record#: [id number]


Ordering Physician: Ken JEAN-BAPTISTE                                             

                        Acct.#: [id number]


:     1955         Age: 64   Sex: M                                   

                        Location: [address] [place] [address]/TELEMETRY


Exam Date: 19                                                       

                        ADM Status: ADM Kory


Order Information:                         US RENAL COMPLETE


Accession Number:                          [id number]


CPT:                                       [id number]


EXAM: 


 US Retroperitoneal Limited, Kidneys 





EXAM DATE/TIME: 


 3/28/2019 9:52 PM 





CLINICAL HISTORY: 


 64 years old, male; Abnormal findings; Abnormal lab test; Abnormal kidney 


function lab tests; Additional info: Elevated CR 





TECHNIQUE: 


 Imaging protocol: Real-time ultrasound of the retroperitoneum with image 


documentation. Examination was focused on the kidneys. 





COMPARISON: 


 ABD COMP US ABDOMEN COMPLETE 2015 8:51 AM 





FINDINGS: 


 Right kidney:  The right kidney is prominent measuring 14.3 x 7.3 x 6.9 cm. No 


hydronephrosis or calculi. 


 Left kidney:  The left kidney is prominent measuring 14.0 x 6.0 x 5.6 cm. No 


hydronephrosis or calculi. 





IMPRESSION: 


No hydronephrosis or visible calculi. 





To contact [facility] with a general question: [facility] - [phone]


For direct physician to physician contact: Physician Hotline - [phone]


[facility] ([facility] Facility ID #[id number])





____________________________________________________________


<Electronically signed by Naga Morgan MD in OV>  19


Dictated By: Naga Morgan MD


Dictated Date/Time: 19


Transcribed Date/Time:  


Copy to:








Patient Name:                    PEDRO GLASS                        

                                                                            

Medical Record#: [id number]


Ordering Physician: Savannah Mandel MD                                       

                                                                            

Acct.#: [id number]


:         1955                    Age: 64   Sex: M                    

                                                                            

Location: [address] [place] [address]/TELEMETRY


Exam Date: 19                                                       

                                                                            ADM 

Status: ADM Kory


Order Information:                                               CT CHEST W/O


Accession Number:                                                [id number]


CPT:                                                             [id number]


EXAM: 


 CT Chest Without Contrast 





EXAM DATE/TIME: 


 3/28/2019 10:51 PM 





CLINICAL HISTORY: 


 64 years old, male; Signs and symptoms; Dyspnea; Additional info: Dyspnea, 


hemoptysis 





TECHNIQUE: 


 Imaging protocol: Axial computed tomography images of the chest without 


intravenous contrast. 


  Coronal and sagittal reformatted images were created and reviewed. 


 Radiation optimization: All CT scans at this facility use at least one of 


these dose optimization techniques: automated exposure control; mA and/or kV 


adjustment per patient size (includes targeted exams where dose is matched to 


clinical indication); or iterative reconstruction. 





COMPARISON: 


 DX CXR CHEST PA LAT 2 VWS 3/28/2019 6:54 PM 





FINDINGS: 


 Lungs:  There are small bilateral pleural effusions and bilateral interstitial 


opacity and right perihilar patchy alveolar opacity suspicious for pneumonitis 


or pulmonary edema. 


 Pleural space: Normal. No pneumothorax. No pleural effusion. 


 Heart:  There are atherosclerotic aortic and coronary artery calcifications. 


 Aorta: Normal. No aortic aneurysm. 


 Lymph nodes:  There is borderline mediastinal lymphadenopathy. 


 Bones/joints:  There are degenerative changes of the spine. There is chronic 


fracture of the left ninth rib. 


 Soft tissues: Unremarkable. 


 Gallbladder and bile ducts:  There are postoperative changes of 


cholecystectomy. 





IMPRESSION: 


1. There are small bilateral pleural effusions and bilateral interstitial 


opacity and right perihilar patchy alveolar opacity suspicious for pneumonitis 


or pulmonary edema. 


2. There is borderline mediastinal lymphadenopathy. 





To contact [facility] with a general question: [facility] - [phone]


For direct physician to physician contact: Physician Hotline - [phone]


[facility] [place] ([facility] Facility ID #[id number])





____________________________________________________________


<Electronically signed by Naga Morgan MD in OV>  19 2338ctated by:" 

fields.


                                                                               

                   1 of 2





EKG Report: 





Sinus rhythm 80  QTc 403 QRS axis -35  Qs III/AVF consistent with 

previous IWMI





Assessment





- Problem List


Assessment: 


Patient Problems





Acute renal failure (Acute)


Adverse drug effect (Acute)


Hemoptysis (Acute)


Pulmonary-renal syndrome (Acute)


Atherosclerosis (Chronic)


BMI over 35 (Chronic)


Diabetic neuropathy (Chronic)


Essential hypertension (Chronic)


Hyperlipidemia (Chronic)


Microalbuminuria due to type 2 diabetes mellitus (Chronic)


Type 2 diabetes mellitus (Chronic)








Plan: 








Pulmonary-renal syndrome (Acute)Acute renal failure (Acute)Adverse drug effect (

Acute)Hemoptysis (Acute)  Mr. Glass provides a clear history of developing 

new onset hemoptysis, dyspnea, cough, dark urine, oliguria following Tc 99M 

tetrafosmin administration 3/25/2019.  This may be coincidence or it may be 

causation.  I have reviewed the data sheets of [employer] - it doesn't contain 

anything else.  There are no reports on the data sheet nor on a PubMed 

literature search of this adverse effect.  If this is due to exposure to an 

agent with an immune response akin to Goodpasture's syndrome or even ANCA 

associated lung and kidney disease or Churg-Maame/vasculitis  I hope it 

improves when the agent is eliminated by his body.  I will speak with 

nephrology and pulmonology.  My inclination is to give him a massive dose of IV 

steroids - however, I will temper this zeal until I learn whether this will 

compromise our ability to make a tissue diagnosis.  He may require a kidney 

biopsy or a bronchoscopy.  I will check his fractional sodium excretion.  I 

spoke with Dr. Palma:


* for pulmonology consultation ? bronchoscopy.


* Consider large pulse of steroids IV


* Consider kidney biopsy.


Atherosclerosis (Chronic)  He has a RCA stent





Secondary diagnoses.


BMI over 35 (Chronic)


Diabetic neuropathy (Chronic)


Essential hypertension (Chronic) A little elevated


Hyperlipidemia (Chronic)


Microalbuminuria due to type 2 diabetes mellitus (Chronic)


Type 2 diabetes mellitus (Chronic)  on target





I discussed the above with the patient

## 2019-03-29 NOTE — CONS
PULMONARY CONSULTATION REPORT:

 

DATE OF CONSULT:  03/29/19

 

CONSULTATION REQUESTED BY:  Dr. Haris Hernandez.*

 

REASON FOR CONSULTATION:  Evaluation of hemoptysis.

 

HISTORY OF PRESENT ILLNESS:  The patient is a 64-year-old obese male with 
history of coronary artery disease, status post RCA stent; diabetes; 
hypertension; dyslipidemia; peripheral neuropathy; alcohol intake; vertigo, who 
presents to the hospital for evaluation of shortness of breath.  The patient 
reports having chest discomfort like pressure sensation in his chest 2 days 
prior to his current presentation.  He woke up with shortness of breath and 
cough with white phlegm tinged with blood.  He subsequently presented for his 
scheduled stress test.  He had Lexiscan nuclear material injected; however, he 
did not continue with the stress test as he had caffeine that morning.  He was 
told to reschedule it 2 days later and was sent home.  The patient had 
worsening shortness of breath and has noticed more episodes of hemoptysis and 
decided to come into the emergency room for further evaluation.  The patient 
also reports having chills for the past 2 days prior to the presentation.  The 
patient also reports having oliguria for 1 day prior to presentation.  He has 
been drinking water and denies otherwise being dehydrated.  The patient denies 
palpitations, headaches, fevers, nausea, vomiting, diarrhea, lower extremity 
edema.  Denies recent travel or prolonged immobilization. Denies taking NSAIDs 
or other pain medications.  He has been taking 81 mg of aspirin.  He had recent 
blood test, included creatinine which was within normal limits.  His LFTs were 
normal except for mildly elevated AST which could be consistent with his 
alcohol intake.  He had hepatitis C antibody checked in 2015, was negative.  
Urine drug screen was negative.  The patient was noted to have acute renal 
failure with creatinine of 3.37.  He had normal creatinine on 03/18/19.  The 
patient was admitted for evaluation of hemoptysis and acute renal failure. 
Nephrology and Pulmonology were consulted.  The patient seen and examined at 
bedside.  The patient reports mild discomfort in chest and trouble breathing 
with exertion.  He has been having intermittent episodes of cough with streaks 
of blood. The patient also reports history of nosebleeds in the past.  He also 
reports nasal congestion; however, denies sinus issues or allergies.  He denies 
any lung problems or renal problems in the past even with history of diabetes. 
PAST MEDICAL HISTORY:

1.  Coronary artery disease.

2.  Hypertension.

3.  Dyslipidemia.

4.  Type 2 diabetes.

5.  Gout.

6.  Diabetic neuropathy.

7.  Obesity with BMI of 35.

 

PAST SURGICAL HISTORY:  Poor filling gallbladder, status post cholecystectomy.

 

MEDICATIONS AT HOME:

1.  Amlodipine.

2.  Aspirin.

3.  Carvedilol.

4.  Jardiance.

5.  Fenofibrate with gabapentin.

6.  Glipizide.

7.  Insulin lispro.

8.  Losartan.

9.  Magnesium.

10.  Meclizine.

11.  Metformin.

12.  Lovaza.

13.  Rosuvastatin.

 

ALLERGIES:  LISINOPRIL has caused dizziness.

 

FAMILY HISTORY:  Father with dementia and passed of natural causes at 85.  
Mother has dementia.

 

SOCIAL HISTORY:  Retired nuclear .  Former smoker, quit at age 
of 44.  Occasional alcohol intake.  No drug abuse.

 

REVIEW OF SYSTEMS:  All 14-systems reviewed and as per HPI.

 

PHYSICAL EXAM:  Obese male in bed, in no apparent distress.  Vital Signs: 
Temperature 98, pulse of 78 beats per minute, respiratory rate 16 per minute, 
O2 sat 94% on room air, blood pressure 142/79.  HEENT:  Pupils equal and 
reactive to light.  Mucous membranes moist.  Lungs:  Some crackles and rhonchi 
in the right base.  Cardiovascular:  S1 and S2 present, regular.  No murmurs, 
gallops, or rubs. Extremities:  Normal range of motion.  No edema.  Skin:  Rash 
in the front of the chest and a little bit on the extremities.  The patient 
reported that it is common for him to have rash after a dermatological biopsy, 
which he apparently had recently.  Neuro:  Alert, awake, oriented x3.  No focal 
deficits.

 

DIAGNOSTIC STUDIES/LAB DATA:  WBC count 5.2, hemoglobin 13.8, hematocrit 41, 
platelet count of 147.  Sodium 142, potassium 3.7, chloride 110, bicarb 22, BUN 
52, creatinine 3.37, glucose 131.  CRP significantly elevated at 125.  UA 
positive for blood with no rbc's.  Rheumatoid factor mildly elevated.  Rest of 
the rheumatological tests are pending.  The patient of note has history of mild 
thrombocytopenia.

 

CT scan of the chest was personally reviewed by me and was discussed with the 
patient today, has an evidence of airspace opacities, some with nodular 
configuration along with small bilateral pleural effusion and patchy right 
perihilar opacities.  No obvious lymphadenopathy, maybe borderline 
lymphadenopathy seen.

 

IMPRESSION AND RECOMMENDATIONS:  64-year-old male, former smoker with history 
of diabetes and coronary artery disease, admitted with hemoptysis, also found 
to have acute renal failure.  Symptoms are suggestive of pulmonary renal 
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  Vasculitis workup 
was sent, pending at this time.  Given the acute renal failure and high 
suspicion for ANCA associated vasculitis, I would recommend starting on pulse-
dose steroids.

 

The patient also had Renal consultation with Dr. Palma.  I have discussed 
the case with Dr. Palma who also agrees with the possibility of ANCA 
associated vasculitis with pulmonary renal syndrome and has agreed to start the 
patient on pulsed steroids.  Obviously, he needs to be closely monitored in the 
hospital for poorly controlled diabetes in the setting of steroids.  We will 
schedule the patient for bronchoscopy, which will be performed next week on 
Monday at 11:30. The patient will be n.p.o. after midnight Sunday night.

 

Thank you for allowing me to participate in the care of your patient.  Will 
follow up with you.  Will discuss with Dr. Hernandez.

 

TIME SPENT:  Sixty minutes or more was spent, most of which was spent face-to-
face with the patient.

 

 [id number]/CPS #: [id number]

LEMUEL

## 2019-03-29 NOTE — HP
CC:  Dr. Haris Hernandez; Dr. Gal Prabhakar *

 

HISTORY AND PHYSICAL:

 

DATE OF ADMISSION:  03/28/19

 

TIME OF EVALUATION:  10 p.m.

 

PRIMARY CARE PROVIDER:  Haris Hernandez MD

 

CARDIOLOGIST:  Gal Prabhakar MD

 

CHIEF COMPLAINT:  Shortness of breath.

 

HISTORY OF PRESENT ILLNESS:  Mr. Vega is a 64-year-old gentleman with a 
past medical history of obesity with a BMI of 35, coronary artery disease 
status post stent in the RCA, hypertension, hyperlipidemia, type 2 diabetes, 
gout, peripheral neuropathy, alcoholism, vertigo, who presents to the emergency 
room with complaints of mild shortness of breath.

 

The patient underwent a Lexiscan Myoview on 03/27/19.  He states that he had a 
pharmacological stress test and this morning he woke up with mild shortness of 
breath associated with productive cough with white sputum tinged with blood.  
He states that the breathing did not improve and for that reason he presented 
to the emergency room for further evaluation.

 

He states that today he also noticed that he was making less urine than usual 
and he saw this was weird since he has been following  a [employer] diet and 
he has to drink a lot of water and he states that he is compliant with it.

 

He denies chest pain, palpitations.  He states that he only takes his 81 mg 
aspirin and denies taking any other NSAIDs or pain medications as outpatient.

 

He denies fever, chills, nausea, vomiting, diarrhea, lower extremity edema or 
other complaints.

 

PAST MEDICAL HISTORY:

1.  Coronary artery disease status post inferior MI in 2005 with drug-eluting 
stent placement to the RCA.

2.  Hypertension.

3.  Hyperlipidemia.

4.  Type 2 diabetes.

5.  Gout.

6.  Diabetic neuropathy.

7.  Obesity with a BMI of 35.

8.  Vertigo.

 

PAST SURGICAL HISTORY:  History of poor filling gallbladder, status post 
cholecystectomy.

 

MEDICATION LIST:

1.  Amlodipine 5 mg p.o. b.i.d.

2.  Aspirin 81 mg p.o. daily.

3.  Carvedilol 25 mg p.o. b.i.d.

4.  Jardiance 10 mg p.o. daily.

5.  Fenofibrate 45 mg p.o. daily.

6.  Gabapentin 300 mg p.o. in the morning and 600 mg p.o. at bedtime.

7.  Glipizide 10 mg p.o. in the morning.

8.  Insulin glargine 40 units subcutaneously daily.

9.  Lispro 10 units subcutaneously before meals.

10.  Losartan 25 mg p.o. daily.

11.  Magnesium oxide 250 mg p.o. daily.

12.  Meclizine 25 mg p.o. t.i.d. as needed for dizziness.

13.  Metformin 1000 mg p.o. b.i.d.

14.  Lovaza 1 g p.o. b.i.d.

15.  Rosuvastatin 10 mg p.o. daily.

 

ALLERGIES:  With LISINOPRIL the patient has dizziness.

 

FAMILY HISTORY:  Father had a history of dementia and passed of natural causes 
at age 85.  Mother has dementia.  Siblings have a history of hypertension.

 

SOCIAL HISTORY:  The patient is .  Retired, nuclear .  
He is a former smoker. He quit at age 44.  The patient occasionally consumes 
liquor.  No history of drug use.  Surrogate decision maker is his son, Lam Vega, phone number [phone].

 

REVIEW OF SYSTEMS:  A 14-point review of systems was performed, and all the 
pertinent negatives and positive findings are in the HPI.

 

                               PHYSICAL EXAMINATION

 

GENERAL:  The patient is a pleasant obese gentleman, sitting up in the ED 
stretcher, in no acute distress.

 

VITAL SIGNS:  Temperature 99.1, heart rate is 87, respiratory rate is 17, 
oxygen saturation 91% on room air, blood pressure is 159/96.

 

HEENT:  Pupils are equal.  Moist mucous membranes.

 

CHEST:  Breath sounds bilaterally with no added sounds.

 

CVS:  Normal S1, S2.  Regular rate and rhythm.

 

ABDOMEN:  Obese, soft, nontender.  Bowel sounds are present.

 

EXTREMITIES:  No edema.

 

NEURO:  He is alert and oriented x3.  Able to move all 4 extremities.

 

DIAGNOSTIC STUDIES/LAB DATA:  The patient had a CBC that showed WBC of 5.4, 
hemoglobin 14.1, hematocrit 41, platelets 149 with 65% neutrophils.  INR is 
1.08.  Chemistry showed sodium 141, potassium 3.9, chloride 109, bicarb 23, BUN 
of 53, creatinine of 3.6, glucose of 142, lactic acid of 0.5, calcium of 9.3, 
magnesium of 1.7.  Total bilirubin of 0.7, AST of 51, ALT of 35, alk phos of 
26. Two troponins were negative.  BNP is 383.  TSH is 1.6.

 

Urinalysis showed 1+ protein, 2+ blood.  Trace wbc's, absent rbc's and squamous 
epithelial cells are present.

 

His nuclear medicine stress test done on 03/27/19, showed photopenia in the 
inferior wall likely represents diaphragmatic attenuation.  No reversible 
changes noted.  Normal ejection fraction, normal wall motion and assessment was 
a low risk stress test.

 

EKG done on 03/28/19 at 1845, showed normal sinus rhythm at 80 beats per minute 
with an inferior wall infarct.  No significant change when compared to his 
prior EKG from March 2016.  Renal ultrasound is pending at the time of this 
dictation.

 

ASSESSMENT AND PLAN:  Mr. Vega is a 64-year-old male with a past medical 
history of inferior wall MI in 2005, status post drug-eluting stent to the RCA, 
hypertension, hyperlipidemia, type 2 diabetes, gout, diabetic neuropathy, 
obesity, vertigo, who presented to the emergency room with complaints of mild 
shortness of breath, hemoptysis and decreased urinary output 24 hours after 
Lexiscan Myoview stress test.

 

1.  Acute kidney injury.  There are no really clear culprits at this time.  The 
patient denies taking any new medications including over-the-counter NSAIDs for 
pain.  He states that he has been on his hypertension and diabetic medication 
for "a longtime."  His presentation could be compatible with early stages of a 
pulmonary renal syndrome as he has mild shortness of breath, hemoptysis, 
hematuria and creatinine elevation.  Of note, his creatinine was 0.97 on 03/18/
19.

The case was discussed with Nephrology (Dr. Palma) who recommended checking 
urine eosinophils, sodium, creatinine, urea, SPEP, UPEP, serum kappa and lambda 
chains, ROMI, ANCA, anti-GBM and CPK level since the patient has blood in the 
urine, but no rbc's.  A renal ultrasound was performed in the emergency room 
and the result is pending at the time of this dictation.  The patient was able 
to void in the emergency room spontaneously and had a post residual void of 0.  
He will receive gentle IV hydration and we will monitor his renal function.  
Nephrology will see him in consultation to determine the remainder of his 
workup.



2.  Hemoptysis.  The patient's chest x-ray shows only mild vascular congestion 
to my read.  I am going to check a CT of the chest without contrast to see if 
there is any other findings in the parenchyma, but as described above I am 
concerned that this may represent early stages of a pulmonary renal syndrome.



3.  Type 2 diabetes.  For now, I am going to hold his Jardiance and metformin 
and he will be continued on his glargine and lispro.



4.  Hypertension.  We will continue amlodipine and carvedilol but we will hold 
his losartan for now.



5.  Coronary artery disease.  The patient had a negative stress test yesterday.
  We will continue his aspirin, carvedilol, and statin.



6.  DVT prophylaxis.  The patient has a score of 3 on the DVT prophylaxis risk 
assessment guide and he will be started on SCDs as he is having hemoptysis and 
hematuria, so we will avoid heparin for now.



7. Code status was discussed with the patient and he wishes to be a do not 
resuscitate and a MOLST form was filled up.

 

TIME SPENT:  Approximately 55 minutes was spent with the patient interview, 
medical records review, physical examination to complete this admission; more 
than half of this time was spent face-to-face with the patient in coordination 
of care.

 

 

 

[id number]/[facility] #: [id number]

LEMUEL

## 2019-03-30 LAB
ALBUMIN SERPL BCG-MCNC: 3.8 G/DL (ref 3.2–5.2)
ALBUMIN/GLOB SERPL: 1.3 {RATIO} (ref 1–3)
ALP SERPL-CCNC: 31 U/L (ref 34–104)
ALT SERPL W P-5'-P-CCNC: 59 U/L (ref 7–52)
ANION GAP SERPL CALC-SCNC: 11 MMOL/L (ref 2–11)
AST SERPL-CCNC: 63 U/L (ref 13–39)
BASOPHILS # BLD AUTO: 0 10^3/UL (ref 0–0.2)
BUN SERPL-MCNC: 58 MG/DL (ref 6–24)
BUN/CREAT SERPL: 20.5 (ref 8–20)
CALCIUM SERPL-MCNC: 9.2 MG/DL (ref 8.6–10.3)
CHLORIDE SERPL-SCNC: 112 MMOL/L (ref 101–111)
EOSINOPHIL # BLD AUTO: 0 10^3/UL (ref 0–0.6)
GLOBULIN SER CALC-MCNC: 2.9 G/DL (ref 2–4)
GLUCOSE SERPL-MCNC: 234 MG/DL (ref 70–100)
HCO3 SERPL-SCNC: 20 MMOL/L (ref 22–32)
HCT VFR BLD AUTO: 41 % (ref 36–46)
HGB BLD-MCNC: 13.7 G/DL (ref 14–18)
LYMPHOCYTES # BLD AUTO: 0.3 10^3/UL (ref 1–4.8)
MCH RBC QN AUTO: 31 PG (ref 27–31)
MCHC RBC AUTO-ENTMCNC: 33 G/DL (ref 31–36)
MCV RBC AUTO: 93 FL (ref 80–94)
MONOCYTES # BLD AUTO: 0.1 10^3/UL (ref 0–0.8)
NEUTROPHILS # BLD AUTO: 4.4 10^3/UL (ref 1.5–7.7)
NRBC # BLD AUTO: 0 10^3/UL
NRBC BLD QL AUTO: 0
PLATELET # BLD AUTO: 161 10^3/UL (ref 150–450)
POTASSIUM SERPL-SCNC: 3.9 MMOL/L (ref 3.5–5)
PROT SERPL-MCNC: 6.7 G/DL (ref 6.4–8.9)
RBC # BLD AUTO: 4.39 10^6 /UL (ref 4.18–5.48)
SODIUM SERPL-SCNC: 143 MMOL/L (ref 135–145)
WBC # BLD AUTO: 4.8 10^3/UL (ref 3.5–10.8)

## 2019-03-30 RX ADMIN — INSULIN LISPRO SCH UNITS: 100 INJECTION, SOLUTION INTRAVENOUS; SUBCUTANEOUS at 12:21

## 2019-03-30 RX ADMIN — CARVEDILOL SCH MG: 25 TABLET, FILM COATED ORAL at 09:09

## 2019-03-30 RX ADMIN — INSULIN GLARGINE SCH UNITS: 100 INJECTION, SOLUTION SUBCUTANEOUS at 12:22

## 2019-03-30 RX ADMIN — INSULIN GLARGINE SCH UNITS: 100 INJECTION, SOLUTION SUBCUTANEOUS at 23:55

## 2019-03-30 RX ADMIN — GABAPENTIN SCH MG: 400 CAPSULE ORAL at 21:34

## 2019-03-30 RX ADMIN — AMLODIPINE BESYLATE SCH MG: 5 TABLET ORAL at 09:09

## 2019-03-30 RX ADMIN — ASPIRIN SCH MG: 81 TABLET, COATED ORAL at 09:08

## 2019-03-30 RX ADMIN — CARVEDILOL SCH MG: 25 TABLET, FILM COATED ORAL at 21:34

## 2019-03-30 RX ADMIN — ATORVASTATIN CALCIUM SCH MG: 40 TABLET, FILM COATED ORAL at 09:09

## 2019-03-30 RX ADMIN — GABAPENTIN SCH MG: 300 CAPSULE ORAL at 09:09

## 2019-03-30 RX ADMIN — INSULIN LISPRO SCH UNITS: 100 INJECTION, SOLUTION INTRAVENOUS; SUBCUTANEOUS at 09:08

## 2019-03-30 RX ADMIN — INSULIN GLARGINE SCH UNITS: 100 INJECTION, SOLUTION SUBCUTANEOUS at 09:08

## 2019-03-30 RX ADMIN — AMLODIPINE BESYLATE SCH MG: 5 TABLET ORAL at 21:34

## 2019-03-30 RX ADMIN — INSULIN LISPRO SCH UNITS: 100 INJECTION, SOLUTION INTRAVENOUS; SUBCUTANEOUS at 17:33

## 2019-03-30 NOTE — PN
PROGRESS NOTE:

 

DATE OF VISIT:

 

SERVICE:  [facility].

 

SUBJECTIVE:  The patient seen and examined at bedside.  The patient reports feeling better today.  Re
ports that he feels less tired and less dyspneic.  Vitals and labs have been reviewed.  Creatinine no
danny to be improved at 2.8 from 3.6 when he initially came in.

 

PHYSICAL EXAMINATION:  HEENT:  NCAT.  Heart:  S1, S2 present.  Regular rate on exam.  Lungs:  Minimal
 crackles at the bases.  Abdomen:  Soft, obese, nontender. No rebound, no guarding.  Extremities:  No
danny to have no edema.  Neuro:  Alert and oriented.

 

ASSESSMENT AND PLAN:  Acute kidney injury with possible rapidly progressive glenohumeral nephritis/ra
pidly progressive glomerulonephritis secondary to pulmonary renal syndrome like Birmingham's granulomatos
is/ANCA vasculitis/Goodpasture. Pulmonary renal syndrome is high on the differential, drug-induced ph
enomenon is also being considered.  Plan for bronchoscopy on Monday with Dr. Desouza.  Extensive serol
ogic workup has been performed and workup is currently pending.  Please refer to my consult from [facility] for full detailed plan.

 

With respect to treatment, for now we will continue the prednisone at 1 g a day, today being his seco
nd dose and we will plan a third dose tomorrow and trend his kidney function and evaluate his pulmona
ry status closely.  By then on Monday, we may have results of the vasculitis panel sent out and furth
er management plan can be made based on his bronchoscopy findings, clinical condition and results of 
the workup.

 

 [id number]/CPS #: [id number]

## 2019-03-30 NOTE — CONS
NEPHROLOGY CONSULTATION:

 

DATE OF CONSULT:  03/29/19

 

SERVICE:  [facility].

 

REQUESTING PHYSICIAN:  Dr. Hernandez/Dr. Carvajal.

 

REASON FOR CONSULTATION:  Acute kidney injury.

 

HISTORY OF PRESENT ILLNESS:  A 64-year-old male with history of coronary artery 
disease, status post RCA stent, diabetes, hypertension, dyslipidemia, 
peripheral neuropathy, alcohol intake, vertigo, came in to the hospital 
overnight with symptoms of shortness of breath and also noted to have a 
creatinine of 3.6.  The patient's labs checked on 03/18/19 show a BUN of 17 and 
a creatinine of 0.97.  The patient reports that he has been in fairly good 
health and over the last week has felt more weak.  The patient has had some 
problems with his sinus in the past.  The patient also reports that he had an 
episode of chest pain, was noted to have abnormal EKG and was scheduled for 
stress test on Monday, 03/25/19.  The patient not able to clearly recall if he 
felt worse and if his symptoms started prior to his stress test; however, 
distinctly remembers that since Monday, his condition has deteriorated.  The 
patient reports that he had his nuclear portion of the test on Monday, 03/25/19
, but could not have the rest of the stress test as he had had caffeine.  The 
patient came back on 03/27/19 for his stress images.  Between 03/25/19 and 03/27
/19, the patient describes that he was punked out; and when he came on 03/27/19
, he was noted to have brown urine, also has been having more shortness of 
breath.  The patient then developed hemoptysis and has been coughing up blood 
with some difficulty breathing.  The patient also reports orthopnea and reports 
feeling beat down and he called his cardiologist yesterday and was advised to 
come to the ER; and in the ER, the patient was noted to have acute kidney 
injury and also noted to have changes on his CAT scan.

 

The patient has also been on a special [employer] diet over the last 2 months 
and has been eating what he has received in the meal and has been drinking 
water consistently.  The patient denies a family history of kidney disease.  
Denies a family history of any autoimmune disease that he knows of.  The 
patient reports that he has had stress test before; however, had treadmill 
stress test  and has not had a nuclear stress test before. PAST MEDICAL HISTORY:

1.  Coronary artery disease, status post MI in 2005 with drug-eluting stent in 
the RCA.

2.  Hypertension.

3.  Hyperlipidemia.

4.  Type 2 diabetes.

5.  Gout.

6.  Diabetic neuropathy.

7.  Obesity with a BMI of 35.

8.  Vertigo.

 

PAST SURGICAL HISTORY:  Cholecystectomy, angioplasty with RCA stent.

 

MEDICATIONS:  Medication list prior to hospital stay:

1.  Amlodipine 5 mg p.o. b.i.d.

2.  Aspirin 81 mg p.o. daily.

3.  Carvedilol 25 mg p.o. b.i.d.

4.  Jardiance 10 mg p.o. daily.

5.  Fenofibrate 45 mg p.o. daily.

6.  Gabapentin 300 mg p.o. in the morning, 600 mg p.o. at bedtime.

7.  Glipizide 10 mg p.o. in the morning.

8.  Insulin glargine 40 units subcutaneous daily.

9.  Lispro 10 units subcutaneously before meals.

10.  Losartan 25 mg p.o. daily.

11.  Magnesium oxide 250 mg p.o. daily.

12.  Meclizine 25 mg p.o. t.i.d. as needed for dizziness.

13.  Metformin 1000 mg p.o. b.i.d.

14.  Lovaza 1 g p.o. b.i.d.

15.  Rosuvastatin 10 mg p.o. daily.

 

ALLERGIES:  With LISINOPRIL, the patient had dizziness.

 

FAMILY HISTORY:  Father had a history of dementia and passed of natural causes 
at the age of 85.  Mother had dementia.  Siblings have a history of 
hypertension.

 

SOCIAL HISTORY:  The patient is , retired.  The patient reports that he 
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 
radioactive substances, but followed proper precautions and protocol, worked 
there fore over 20 years.  Former smoker, quit at age 44.  Reports that he 
drinks about a pint of alcohol in 3 weeks.  Denies any recreational drug use.

 

REVIEW OF SYSTEMS:  As mentioned in the HPI, other 14-point review of systems 
noted to be negative.

 

PHYSICAL EXAM:  Vitals:  Temperature 99.1, heart rate 87, respiratory rate 17, 
oxygen saturation 91% on room air when he initially came, blood pressure 159/
96. General:  Pleasant and communicative at time of exam.  Appears to be mildly 
short of breath between sentences and coughing, otherwise no acute distress.  
HEENT: Pupils equal.  Moist mucous membranes.  Chest:  Noted to have bilateral 
crackles. Heart:  S1 and S2 present, regular at time of exam.  Abdomen:  Obese, 
soft, nontender.  Bowel sounds present.  No rebound, no guarding.  Extremities:
  Noted to have no edema.  Neuro:  Alert, oriented x3.  No focal deficits.  
Able to move all 4 extremities.

 

DIAGNOSTIC STUDIES/LAB DATA:  WBC 5.4, hemoglobin 14.1, hematocrit 41, 
platelets 149, 65% neutrophils.  INR 1.08.  Sodium 141, potassium 3.9, chloride 
109, bicarb 23, BUN 53, creatinine noted to be 3.6 and today noted to be 3.3, 
glucose 142, lactic acid 0.5, calcium 9.3, magnesium 1.7.  AST 51, ALT 35, alk 
phos 26.  BNP noted to be 383.  TSH 1.6.  UA showing 1+ protein, 2+ blood, 
trace WBCs.  Initial UA was read as no RBCs.  I have personally examined the 
samples.  The patient does have some RBCs and possible 1 or 2 dysmorphic RBCs, 
but does not have complete Alvarez Mouse appearance and no red blood cell casts.
  The patient also noted to have some white cells in the urine, but does not 
have a WBC cast. Recent stress test shows normal ejection fraction.  Photopenia 
in the inferior wall, likely represent diaphragmatic attenuation.  Renal 
ultrasound has been reviewed.  No evidence of hydronephrosis.  The patient had 
CT of his chest, which showed small bilateral pleural effusions and bilateral 
interstitial opacity and right perihilar patchy alveolar opacity, suspicious 
for pneumonitis or pulmonary edema.  Borderline mediastinal lymphadenopathy.

 

ASSESSMENT AND PLAN:  A 64-year-old male with past medical history of inferior 
wall myocardial infarction, status post drug-eluting stent to the right 
coronary artery, hypertension, hyperlipidemia, type 2 diabetes, gout, obesity, 
vertigo, presenting with evidence of pulmonary and renal symptoms.

 

1.  Acute kidney injury with possible rapidly progressive glomerulonephritis/
RPGN secondary to pulmonary renal disease, like Wegener's granulomatosis/ANCA 
vasculitis/ Good Pasture( Anti GBM) is very high on the differential with his 
quick onset of symptoms and with his pulmonary and renal features.  The patient 
was hydrated overnight and has had minimal improvement in his renal function; 
however, the patient at this time is getting volume overloaded and is at risk 
for jason pulmonary edema; and in light of this, we will hold off on further IV 
fluid administration.  The patient denies any recent NSAID use.  ANCA panel for 
vasculitis and anti-GBM antibody to evaluate for Goodpasture's have  been sent 
out and at this time pending.  Allergic interstitial nephritis with allergy to 
technetium-99 is also possible,however there are no reported cases of AIN or 
ANCA vasculitis secondary to technetium.  We will  check urine eosinophils, 
urine sodium, FENa, SPEP, UPEP, kappa and lambda chains, ROMI, ANCA anti-GBM as 
discussed above, CPK levels to rule out rhabdomyolysis.  Also recommended 
getting tox screen, which has been negative.  The patient does not have anion 
gap to suggest any toxic ingestion.  The patient's lactic acid within normal 
limits and this was checked as the patient was on metformin and there is no 
evidence of lactic acidosis.  Renal ultrasound that was ordered does not show 
evidence of obstructive pathology that could be worsening his renal failure.

2.  In light of everything described above and his clinical condition and 
features consistent with pulmonary renal syndrome, possible ANCA vasculitis/
Wegener's granulomatosis or possible Goodpasture's.  We will pulse the patient 
with prednisone 1 g a day for 3 days.  The patient's ANCA panel should 
hopefully return by then.  Case has also been discussed with pulmonologist, Dr. Desouza, and she is in agreement and also has vasculitis high on her 
differential.  We will start with pulse steroids and per discussion with her, 
she will plan a bronchoscopy on Monday with a biopsy to get a tissue diagnosis.
  Kidney biopsy would not be required if his ANCA/PR3/MPO come back positive 
and the patient will also have a bronchoscopy with biopsy, but if needed, can 
consider this at a later time.

3.  With respect to treatment after the pulse of steroids and evaluating his 
ANCA results and clinical progress, can then add cyclophosphamide for treatment 
and come up with a prednisone dose that works for him.  Can also consider 
rituximab for long- term management if confirmed.If his renal function 
deteriorates or he develops signs of diffuse alveolar hemorrhage in the setting 
of ANCA, he will need Plasmapharesis and will need to check if the local [facility] performs it here or may need transfer to another center.If his anti-GBM 
is positive, plasmapharesis would be indicated. If this is classic drug induced 
ANCA, he would be dually positive for PR3 and MPO and may in the setting of this
, not need much immunosuppresion.Pt was previously not on Hydralazine, 
Minocycline, Propylthiouracil or any such offenders.Denies cocaine or 
recreational drug use.Will also get an anti histone ab.

4.  The administration and his stress test timing could be coincidental and no 
reports of such presentation being causal.  However, we will keep this in mind 
as well and we will evaluate this further based on the patient's clinical 
progress and condition.

5.  For now, recommend holding Jardiance and metformin in the setting of [facility] 
and Dr. Hernandez already closely involved in the patient's case and has been the 
patient's primary care doctor for years and the patient trusts his judgment and 
further management per Dr. Hernandez.

6.  We will also check for completion hepatitis cryoglobulin,ROMI and other 
autoimmune markers as outlined in the orders.

7.Allergic Interstial Nephritis as mentioned is also in the differential and w/
u in progress

 

TIME SPENT:  Total time spent with the patient in discussion and evaluation of 
his results and discussion with the labs is equal to more than 60 minutes.

 

 [id number]/CPS #: [id number]

LEMUEL

## 2019-03-30 NOTE — PN
Subjective





- Subjective


Reason for Note: Progress Note


History: 





He slept well, but had some hangover from temazepam 30 mg.  He is feeling much 

better today.  He no longer has chest pain.  He continues to bring up brown 

sputum.  He has no abdominal pain.  His glucose levels are rising from the 

methylprednisone


Active Problems: 


 Active Problems





Acute renal failure (Acute) 


Adverse drug effect (Acute) T50.905A


Hemoptysis (Acute) R04.2


Pulmonary-renal syndrome (Acute) M31.0


Atherosclerosis (Chronic) I70.90


BMI over 35 (Chronic) [id number]


Diabetic neuropathy (Chronic) E11.40


Essential hypertension (Chronic) I10


Hyperlipidemia (Chronic) E78.5


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


Type 2 diabetes mellitus (Chronic) 








Current Medications: 


 Current Medications





Acetaminophen (Tylenol Tab*)  650 mg PO Q6H PRN


   PRN Reason: pain/fever


Amlodipine Besylate (Norvasc Tab*)  5 mg PO BID [facility]


   Last Admin: 03/30/19 09:09 Dose:  5 mg


Aspirin (Aspirin Ec Tab*)  81 mg PO DAILY [facility]


   Last Admin: 03/30/19 09:08 Dose:  81 mg


Atorvastatin Calcium (Lipitor*)  40 mg PO DAILY [facility]; Protocol


   Last Admin: 03/30/19 09:09 Dose:  40 mg


Carvedilol (Coreg Tab*)  25 mg PO BID [facility]


   Last Admin: 03/30/19 09:09 Dose:  25 mg


Gabapentin (Neurontin Cap(*))  400 mg PO BEDTIME [facility]


   Last Admin: 03/29/19 21:44 Dose:  400 mg


Gabapentin (Neurontin Cap(*))  300 mg PO QAM [facility]


   Last Admin: 03/30/19 09:09 Dose:  300 mg


Methylprednisolone Sodium Succinate 1,000 mg/ Sodium Chloride  1,000 mls @ 100 

mls/hr IVPB ONCE ONE


   Stop: 03/30/19 19:30


Insulin Glargine (Lantus(*))  30 units SUBCUT DAILY [facility]


   Last Admin: 03/30/19 09:08 Dose:  30 units


Insulin Human Lispro (Humalog*)  10 units SUBCUT AC [facility]


   Last Admin: 03/30/19 09:08 Dose:  10 units


Meclizine HCl (Antivert Tab*)  25 mg PO TID PRN


   PRN Reason: VERTIGO


Prochlorperazine Edisylate (Compazine Inj*)  5 mg IV Q6H PRN


   PRN Reason: NAUSEA/VOMITING


Temazepam (Restoril Cap*)  30 mg PO BEDTIME PRN


   PRN Reason: INSOMNIA








Home Medications: 


 Home Medications











 Medication  Instructions  Recorded  Confirmed  Type


 


Gabapentin CAP(*) [Neurontin 300 1 tab PO QAM 02/04/16 03/28/19 History





CAP(*)]    


 


Gabapentin CAP(*) [Neurontin 300 2 tab PO BEDTIME 02/04/16 03/28/19 History





CAP(*)]    


 


Glipizide 10 mg PO QAM 02/04/16 03/28/19 History


 


Metformin HCl [Glucophage] 1 tab PO BID 02/04/16 03/28/19 History


 


Amlodipine Besylate [Amlodipine 5 mg PO BID 02/05/16 03/28/19 History





Besylate-]    


 


Meclizine TAB* [Antivert 12.5 TAB*] 25 mg PO TID PRN #15 tab 03/13/16 03/28/19 

Rx


 


Aspirin [Adult Aspirin Regimen] 1 tab PO DAILY 03/21/19 03/28/19 History


 


Carvedilol TAB* [Coreg TAB*] 25 mg PO BID 03/21/19 03/28/19 History


 


Empagliflozin [Jardiance] 10 mg PO DAILY 03/21/19 03/28/19 History


 


Fenofibrate Nanocrystallized 145 mg PO DAILY 03/21/19 03/28/19 History





[Tricor]    


 


Insulin Glargine,Hum.rec.anlog 40 unit SUBCUT DAILY 03/21/19 03/28/19 History





[Basaglar Kwikpen U-100]    


 


Insulin LISPRO* [HumaLOG*] 10 units SUBCUT AC 03/21/19 03/28/19 History


 


Losartan TAB* [Cozaar TAB*] 25 mg PO DAILY 03/21/19 03/28/19 History


 


Magnesium Oxide [Magnesium] 1 tab PO DAILY 03/21/19 03/28/19 History


 


Omega-3 Acid Ethyl Esters [Lovaza] 1 gm PO BID 03/21/19 03/28/19 History


 


Rosuvastatin Calcium [Crestor] 20 mg PO DAILY 03/21/19 03/28/19 History











Allergies: 


 Allergies











Allergy/AdvReac Type Severity Reaction Status Date / Time


 


lisinopril AdvReac Intermediate Dizziness Verified 03/27/19 13:33














Objective





- Vital Signs


Vital Signs: 


 Vital Signs











  03/29/19 03/29/19 03/29/19





  11:12 19:41 21:43


 


Temperature 98.0 F  


 


Pulse Rate 78  78


 


Respiratory 16 20 





Rate   


 


Blood Pressure 142/79  152/88





(mmHg)   


 


O2 Sat by Pulse 94  





Oximetry   














  03/29/19 03/29/19 03/29/19





  21:44 22:56 23:15


 


Temperature  97.4 F 97.4 F


 


Pulse Rate  79 79


 


Respiratory 20 18 18





Rate   


 


Blood Pressure  153/83 153/83





(mmHg)   


 


O2 Sat by Pulse  93 93





Oximetry   














  03/30/19 03/30/19 03/30/19





  00:00 04:12 07:44


 


Temperature  97.3 F 


 


Pulse Rate  80 


 


Respiratory 18 16 18





Rate   


 


Blood Pressure  142/75 





(mmHg)   


 


O2 Sat by Pulse  91 





Oximetry   














  03/30/19 03/30/19 03/30/19





  08:35 09:09 10:00


 


Temperature 97.3 F  97.3 F


 


Pulse Rate 80  80


 


Respiratory 16 18 16





Rate   


 


Blood Pressure 151/81  151/81





(mmHg)   


 


O2 Sat by Pulse 95  95





Oximetry   














- Intake and Output


Intake and Output: 


 Intake & Output











 03/27/19 03/28/19 03/29/19 03/30/19





 11:59 11:59 11:59 11:59


 


Intake Total   1250 2315


 


Output Total   550 250


 


Balance   700 2065


 


Weight   248 lb 12.8 oz 


 


Intake:    


 


  IV Fluids   1000 995


 


  Oral   250 1320


 


Output:    


 


  Urine   550 250


 


Other:    


 


  Estimated Void   Medium 


 


  # Voids   1 





 





ADLs: Meal  Record                                         Start:  03/28/19 22:

35


Freq:   DAILY@0900,1400,1800                               Status: Active      

  


Protocol:                                                                      

  


 Created      03/28/19 22:35  System  (Rec: 03/28/19 22:35  System  [id number])


 Document     03/29/19 09:00  [id number]  (Rec: 03/29/19 10:19  [id number]  TELE-C07)


 Document     03/29/19 14:00  [id number]  (Rec: 03/29/19 15:10  [id number]  TELE-C07)


 Document     03/29/19 18:00  [id number]  (Rec: 03/29/19 18:59  [id number]  TELE-C07)


Intake and Output                                          Start:  03/28/19 17:

05


Freq:                                                      Status: Cancelled   

  


Protocol:                                                                      

  


 Created      03/28/19 17:05  System  (Rec: 03/28/19 17:05  System  [id number])


Intake and Output                                          Start:  03/28/19 22:

35


Freq:   DAILY@0600,1400,2200                               Status: Cancelled   

  


Protocol:                                                                      

  


 Created      03/28/19 22:35  System  (Rec: 03/28/19 22:35  System  [id number])


Intake and Output                                          Start:  03/28/19 22:

34


Freq:   06,14,2200                                         Status: Active      

  


Protocol:                                                                      

  


 Created      03/28/19 22:36  [id number]  (Rec: 03/28/19 22:36  BK  [id number])


 Document     03/29/19 06:00  [id number]  (Rec: 03/29/19 06:12  [id number]  TELE-C05)


 Document     03/29/19 14:00  [id number]  (Rec: 03/29/19 15:10  [id number]  TELE-C07)


 Document     03/29/19 22:00  [id number]  (Rec: 03/29/19 22:54  [id number]  TELE-C08)


 Document     03/30/19 06:00  [id number]  (Rec: 03/30/19 08:09  [id number]  TELE-C07)











- Physical Exam


General: No Cyanosis, No Anemia, No Jaundice, No Clubbing


Lungs and Chest: Yes: Chest Expansion Full, Chest Expansion Symetrica, 

Percussion Note Resonant, Vessicular Breath Sounds.  No: Crackles, Wheezes, 

Respiratory Distress


Heart Rate and Rhythm: Regular


Additional Cardiovascular: Yes: Normal Heart Sounds.  No: Heart Murmur, Pedal 

Edema


Abdominal Exam: Yes: Soft, Bowel Sounds Present.  No: Distention - obese, 

Abdominal Mass, Abdominal Tenderness, Guarding, Rebound Tenderness





Results





- Results


Lab Results: 


 Laboratory Results - last 24 hr











  03/29/19 03/29/19 03/29/19





  08:44 11:55 15:35


 


WBC   


 


RBC   


 


Hgb   


 


Hct   


 


MCV   


 


MCH   


 


MCHC   


 


RDW   


 


Plt Count   


 


MPV   


 


Neut % (Auto)   


 


Lymph % (Auto)   


 


Mono % (Auto)   


 


Eos % (Auto)   


 


Baso % (Auto)   


 


Absolute Neuts (auto)   


 


Absolute Lymphs (auto)   


 


Absolute Monos (auto)   


 


Absolute Eos (auto)   


 


Absolute Basos (auto)   


 


Absolute Nucleated RBC   


 


Nucleated RBC %   


 


ESR  56 H  


 


Eosinophil Smear   


 


Sodium   


 


Potassium   


 


Chloride   


 


Carbon Dioxide   


 


Anion Gap   


 


BUN   


 


Creatinine   


 


Est GFR ( Amer)   


 


Est GFR (Non-Af Amer)   


 


BUN/Creatinine Ratio   


 


Glucose   


 


POC Glucose (mg/dL)   109 H 


 


Calcium   


 


Total Bilirubin   


 


Direct Bilirubin   


 


Indirect Bilirubin   


 


AST   


 


ALT   


 


Alkaline Phosphatase   


 


Lactate Dehydrogenase   


 


C-Reactive Protein   


 


Total Protein   


 


Albumin   


 


Globulin   


 


Albumin/Globulin Ratio   


 


Urine Color   


 


Urine Appearance   


 


Urine pH   


 


Ur Specific Gravity   


 


Urine Protein   


 


Urine Ketones   


 


Urine Blood   


 


Urine Nitrate   


 


Urine Bilirubin   


 


Urine Urobilinogen   


 


Ur Leukocyte Esterase   


 


Urine WBC (Auto)   


 


Urine RBC (Auto)   


 


Ur Squamous Epith Cells   


 


Urine Bacteria   


 


Ur Random Microalbumin    86.1


 


Ur Creatinine mg/dL    76.87


 


Ur Creatinine Concen    76.87


 


Microalb/Creat Ratio    112.0 H


 


U Sodium Concentration    58


 


Renal Sodium Excretion   


 


Ur Urea Nitrogen Conc    629


 


Urine Glucose   














  03/29/19 03/29/19 03/29/19





  15:35 15:35 17:13


 


WBC   


 


RBC   


 


Hgb   


 


Hct   


 


MCV   


 


MCH   


 


MCHC   


 


RDW   


 


Plt Count   


 


MPV   


 


Neut % (Auto)   


 


Lymph % (Auto)   


 


Mono % (Auto)   


 


Eos % (Auto)   


 


Baso % (Auto)   


 


Absolute Neuts (auto)   


 


Absolute Lymphs (auto)   


 


Absolute Monos (auto)   


 


Absolute Eos (auto)   


 


Absolute Basos (auto)   


 


Absolute Nucleated RBC   


 


Nucleated RBC %   


 


ESR   


 


Eosinophil Smear   


 


Sodium   142 


 


Potassium   


 


Chloride   


 


Carbon Dioxide   


 


Anion Gap   


 


BUN   


 


Creatinine   3.37 H 


 


Est GFR ( Amer)   


 


Est GFR (Non-Af Amer)   


 


BUN/Creatinine Ratio   


 


Glucose   


 


POC Glucose (mg/dL)    82


 


Calcium   


 


Total Bilirubin   


 


Direct Bilirubin   


 


Indirect Bilirubin   


 


AST   


 


ALT   


 


Alkaline Phosphatase   


 


Lactate Dehydrogenase   


 


C-Reactive Protein   


 


Total Protein   


 


Albumin   


 


Globulin   


 


Albumin/Globulin Ratio   


 


Urine Color  Yellow  


 


Urine Appearance  Clear  


 


Urine pH  6.0  


 


Ur Specific Gravity  1.012  


 


Urine Protein  Negative  


 


Urine Ketones  Negative  


 


Urine Blood  1+ A  


 


Urine Nitrate  Negative  


 


Urine Bilirubin  Negative  


 


Urine Urobilinogen  Negative  


 


Ur Leukocyte Esterase  Trace A  


 


Urine WBC (Auto)  Trace(0-5/hpf)  


 


Urine RBC (Auto)  Trace(0-2/hpf)  


 


Ur Squamous Epith Cells  Present A  


 


Urine Bacteria  Absent  


 


Ur Random Microalbumin   


 


Ur Creatinine mg/dL   


 


Ur Creatinine Concen   76.87 


 


Microalb/Creat Ratio   


 


U Sodium Concentration   58 


 


Renal Sodium Excretion   1.79 


 


Ur Urea Nitrogen Conc   


 


Urine Glucose  Negative  














  03/29/19 03/29/19 03/29/19





  20:05 20:58 22:10


 


WBC   


 


RBC   


 


Hgb   


 


Hct   


 


MCV   


 


MCH   


 


MCHC   


 


RDW   


 


Plt Count   


 


MPV   


 


Neut % (Auto)   


 


Lymph % (Auto)   


 


Mono % (Auto)   


 


Eos % (Auto)   


 


Baso % (Auto)   


 


Absolute Neuts (auto)   


 


Absolute Lymphs (auto)   


 


Absolute Monos (auto)   


 


Absolute Eos (auto)   


 


Absolute Basos (auto)   


 


Absolute Nucleated RBC   


 


Nucleated RBC %   


 


ESR   


 


Eosinophil Smear    None seen


 


Sodium   


 


Potassium   


 


Chloride   


 


Carbon Dioxide   


 


Anion Gap   


 


BUN   


 


Creatinine   


 


Est GFR ( Amer)   


 


Est GFR (Non-Af Amer)   


 


BUN/Creatinine Ratio   


 


Glucose   


 


POC Glucose (mg/dL)   168 H 


 


Calcium   


 


Total Bilirubin   


 


Direct Bilirubin   


 


Indirect Bilirubin   


 


AST   


 


ALT   


 


Alkaline Phosphatase   


 


Lactate Dehydrogenase  289 H  


 


C-Reactive Protein   


 


Total Protein   


 


Albumin   


 


Globulin   


 


Albumin/Globulin Ratio   


 


Urine Color   


 


Urine Appearance   


 


Urine pH   


 


Ur Specific Gravity   


 


Urine Protein   


 


Urine Ketones   


 


Urine Blood   


 


Urine Nitrate   


 


Urine Bilirubin   


 


Urine Urobilinogen   


 


Ur Leukocyte Esterase   


 


Urine WBC (Auto)   


 


Urine RBC (Auto)   


 


Ur Squamous Epith Cells   


 


Urine Bacteria   


 


Ur Random Microalbumin   


 


Ur Creatinine mg/dL   


 


Ur Creatinine Concen   


 


Microalb/Creat Ratio   


 


U Sodium Concentration   


 


Renal Sodium Excretion   


 


Ur Urea Nitrogen Conc   


 


Urine Glucose   














  03/29/19 03/30/19 03/30/19





  22:10 05:53 05:53


 


WBC   4.8 


 


RBC   4.39 


 


Hgb   13.7 L 


 


Hct   41 


 


MCV   93 


 


MCH   31 


 


MCHC   33 


 


RDW   15 


 


Plt Count   161 


 


MPV   9.3 


 


Neut % (Auto)   91.4 


 


Lymph % (Auto)   7.1 


 


Mono % (Auto)   1.2 


 


Eos % (Auto)   0 


 


Baso % (Auto)   0.3 


 


Absolute Neuts (auto)   4.4 


 


Absolute Lymphs (auto)   0.3 L 


 


Absolute Monos (auto)   0.1 


 


Absolute Eos (auto)   0 


 


Absolute Basos (auto)   0 


 


Absolute Nucleated RBC   0 


 


Nucleated RBC %   0 


 


ESR   


 


Eosinophil Smear   


 


Sodium  143   143


 


Potassium    3.9


 


Chloride    112 H


 


Carbon Dioxide    20 L


 


Anion Gap    11


 


BUN    58 H


 


Creatinine  3.09 H   2.83 H


 


Est GFR ( Amer)    27.3


 


Est GFR (Non-Af Amer)    22.6


 


BUN/Creatinine Ratio    20.5 H


 


Glucose    234 H


 


POC Glucose (mg/dL)   


 


Calcium    9.2


 


Total Bilirubin    0.60


 


Direct Bilirubin    0.30 H


 


Indirect Bilirubin    0.3


 


AST    63 H


 


ALT    59 H


 


Alkaline Phosphatase    31 L


 


Lactate Dehydrogenase   


 


C-Reactive Protein    72.01 H


 


Total Protein    6.7


 


Albumin    3.8


 


Globulin    2.9


 


Albumin/Globulin Ratio    1.3


 


Urine Color   


 


Urine Appearance   


 


Urine pH   


 


Ur Specific Gravity   


 


Urine Protein   


 


Urine Ketones   


 


Urine Blood   


 


Urine Nitrate   


 


Urine Bilirubin   


 


Urine Urobilinogen   


 


Ur Leukocyte Esterase   


 


Urine WBC (Auto)   


 


Urine RBC (Auto)   


 


Ur Squamous Epith Cells   


 


Urine Bacteria   


 


Ur Random Microalbumin   


 


Ur Creatinine mg/dL   


 


Ur Creatinine Concen  80.03  


 


Microalb/Creat Ratio   


 


U Sodium Concentration  56  


 


Renal Sodium Excretion  1.66  


 


Ur Urea Nitrogen Conc   


 


Urine Glucose   














  03/30/19





  07:41


 


WBC 


 


RBC 


 


Hgb 


 


Hct 


 


MCV 


 


MCH 


 


MCHC 


 


RDW 


 


Plt Count 


 


MPV 


 


Neut % (Auto) 


 


Lymph % (Auto) 


 


Mono % (Auto) 


 


Eos % (Auto) 


 


Baso % (Auto) 


 


Absolute Neuts (auto) 


 


Absolute Lymphs (auto) 


 


Absolute Monos (auto) 


 


Absolute Eos (auto) 


 


Absolute Basos (auto) 


 


Absolute Nucleated RBC 


 


Nucleated RBC % 


 


ESR 


 


Eosinophil Smear 


 


Sodium 


 


Potassium 


 


Chloride 


 


Carbon Dioxide 


 


Anion Gap 


 


BUN 


 


Creatinine 


 


Est GFR ( Amer) 


 


Est GFR (Non-Af Amer) 


 


BUN/Creatinine Ratio 


 


Glucose 


 


POC Glucose (mg/dL)  226 H


 


Calcium 


 


Total Bilirubin 


 


Direct Bilirubin 


 


Indirect Bilirubin 


 


AST 


 


ALT 


 


Alkaline Phosphatase 


 


Lactate Dehydrogenase 


 


C-Reactive Protein 


 


Total Protein 


 


Albumin 


 


Globulin 


 


Albumin/Globulin Ratio 


 


Urine Color 


 


Urine Appearance 


 


Urine pH 


 


Ur Specific Gravity 


 


Urine Protein 


 


Urine Ketones 


 


Urine Blood 


 


Urine Nitrate 


 


Urine Bilirubin 


 


Urine Urobilinogen 


 


Ur Leukocyte Esterase 


 


Urine WBC (Auto) 


 


Urine RBC (Auto) 


 


Ur Squamous Epith Cells 


 


Urine Bacteria 


 


Ur Random Microalbumin 


 


Ur Creatinine mg/dL 


 


Ur Creatinine Concen 


 


Microalb/Creat Ratio 


 


U Sodium Concentration 


 


Renal Sodium Excretion 


 


Ur Urea Nitrogen Conc 


 


Urine Glucose 














Assessment





- Problem List


Assessment: 


Patient Problems





Acute renal failure (Acute)


Adverse drug effect (Acute)


Hemoptysis (Acute)


Pulmonary-renal syndrome (Acute)


Atherosclerosis (Chronic)


BMI over 35 (Chronic)


Diabetic neuropathy (Chronic)


Essential hypertension (Chronic)


Hyperlipidemia (Chronic)


Microalbuminuria due to type 2 diabetes mellitus (Chronic)


Type 2 diabetes mellitus (Chronic)








Plan: 





Pulmonary-renal syndrome (Acute)Acute renal failure (Acute)Adverse drug effect (

Acute) Hemoptysis (Acute)  I have reviewed both Dr. Calderón and Dr. Desouza's 

consultations.  The fractional renal excretion suggests acute tubular necrosis.

  The creatinine has improved some.  He is tolerating the steroids  they have 

improved how he feels. He now has hyperglycemia. He has a positive rheumatoid 

factor suggestive of immunoglobulin immune complexes.  I cannot find any 

reference as to their presence in either [facility] or [facility].  Other 

autoimmune processes and infective endocarditis can have positive RF.  I will 

keep this in mind when the ANCA and anti basement membrane antibody results are 

returned. We will maintain the steroid pulses for 3 days initially.





Secondary diagnosis


Atherosclerosis (Chronic)


BMI over 35 (Chronic)


Diabetic neuropathy (Chronic)


Essential hypertension (Chronic)


Hyperlipidemia (Chronic)


Microalbuminuria due to type 2 diabetes mellitus (Chronic)


Type 2 diabetes mellitus (Chronic)





I explained the above to the patient and I have given him handouts about these 

2 diagnoses:


https://www.niddk.nih.gov/search?q=Granulomatosis+with+polyangiitis&s=all and 

[email or web address]

[email or web address]-causes/[email or web address]-[id number]?p=1





I will discontinue telemetry

## 2019-03-31 LAB
ALBUMIN SERPL BCG-MCNC: 3.7 G/DL (ref 3.2–5.2)
ALBUMIN/GLOB SERPL: 1.4 {RATIO} (ref 1–3)
ALP SERPL-CCNC: 30 U/L (ref 34–104)
ALT SERPL W P-5'-P-CCNC: 46 U/L (ref 7–52)
ANION GAP SERPL CALC-SCNC: 12 MMOL/L (ref 2–11)
AST SERPL-CCNC: 30 U/L (ref 13–39)
BASOPHILS # BLD AUTO: 0 10^3/UL (ref 0–0.2)
BUN SERPL-MCNC: 75 MG/DL (ref 6–24)
BUN/CREAT SERPL: 27.9 (ref 8–20)
CALCIUM SERPL-MCNC: 9.1 MG/DL (ref 8.6–10.3)
CHLORIDE SERPL-SCNC: 109 MMOL/L (ref 101–111)
EOSINOPHIL # BLD AUTO: 0 10^3/UL (ref 0–0.6)
GLOBULIN SER CALC-MCNC: 2.7 G/DL (ref 2–4)
GLUCOSE SERPL-MCNC: 236 MG/DL (ref 70–100)
HCO3 SERPL-SCNC: 20 MMOL/L (ref 22–32)
HCT VFR BLD AUTO: 40 % (ref 36–46)
HGB BLD-MCNC: 13.2 G/DL (ref 14–18)
LYMPHOCYTES # BLD AUTO: 0.4 10^3/UL (ref 1–4.8)
MCH RBC QN AUTO: 31 PG (ref 27–31)
MCHC RBC AUTO-ENTMCNC: 33 G/DL (ref 31–36)
MCV RBC AUTO: 93 FL (ref 80–94)
MONOCYTES # BLD AUTO: 0.4 10^3/UL (ref 0–0.8)
NEUTROPHILS # BLD AUTO: 7 10^3/UL (ref 1.5–7.7)
NRBC # BLD AUTO: 0 10^3/UL
NRBC BLD QL AUTO: 0
PLATELET # BLD AUTO: 185 10^3/UL (ref 150–450)
POTASSIUM SERPL-SCNC: 3.9 MMOL/L (ref 3.5–5)
PROT SERPL-MCNC: 6.4 G/DL (ref 6.4–8.9)
RBC # BLD AUTO: 4.3 10^6 /UL (ref 4.18–5.48)
SODIUM SERPL-SCNC: 141 MMOL/L (ref 135–145)
WBC # BLD AUTO: 7.8 10^3/UL (ref 3.5–10.8)

## 2019-03-31 RX ADMIN — INSULIN GLARGINE SCH UNITS: 100 INJECTION, SOLUTION SUBCUTANEOUS at 21:25

## 2019-03-31 RX ADMIN — AMLODIPINE BESYLATE SCH MG: 5 TABLET ORAL at 08:58

## 2019-03-31 RX ADMIN — INSULIN LISPRO SCH UNITS: 100 INJECTION, SOLUTION INTRAVENOUS; SUBCUTANEOUS at 17:19

## 2019-03-31 RX ADMIN — INSULIN GLARGINE SCH UNITS: 100 INJECTION, SOLUTION SUBCUTANEOUS at 11:43

## 2019-03-31 RX ADMIN — ASPIRIN SCH MG: 81 TABLET, COATED ORAL at 08:59

## 2019-03-31 RX ADMIN — CARVEDILOL SCH MG: 25 TABLET, FILM COATED ORAL at 21:25

## 2019-03-31 RX ADMIN — AMLODIPINE BESYLATE SCH MG: 5 TABLET ORAL at 21:25

## 2019-03-31 RX ADMIN — GABAPENTIN SCH MG: 300 CAPSULE ORAL at 08:57

## 2019-03-31 RX ADMIN — INSULIN LISPRO SCH UNITS: 100 INJECTION, SOLUTION INTRAVENOUS; SUBCUTANEOUS at 11:42

## 2019-03-31 RX ADMIN — ATORVASTATIN CALCIUM SCH MG: 40 TABLET, FILM COATED ORAL at 08:58

## 2019-03-31 RX ADMIN — GABAPENTIN SCH MG: 400 CAPSULE ORAL at 21:24

## 2019-03-31 RX ADMIN — INSULIN LISPRO SCH UNITS: 100 INJECTION, SOLUTION INTRAVENOUS; SUBCUTANEOUS at 08:58

## 2019-03-31 RX ADMIN — CARVEDILOL SCH MG: 25 TABLET, FILM COATED ORAL at 08:57

## 2019-03-31 NOTE — PN
PROGRESS NOTE:

 

DATE OF VISIT:  03/31/19

 

SERVICE:  [facility].

 

SUBJECTIVE:  The patient seen and examined at the bedside.  The patient reports 
feeling better, has not had any further hemoptysis.  Creatinine improved to 2.6 
from 2.8, it was initially 3.6 when he came in.

 

PHYSICAL EXAMINATION:  HEENT:  NCAT.  Heart:  S1, S2 present.  Regular rate on 
exam.  Lungs:  Clear to auscultation bilaterally.  Abdomen:  Soft.  Extremities
: Noted to have no edema.  Neuro:  Alert and oriented x3.

 

ASSESSMENT AND PLAN:  Acute kidney injury with possible rapidly progressive 
glomerulonephritis with pulmonary renal syndrome being high on the list.  
Patient's anti-GBM antibody was noted to be negative.  The patient's ANCA is 
still pending. Drug-induced phenomenon is also being considered.  Please refer 
to my initial consult for full detailed workup and plan.

 

Plan is for bronchoscopy with possible biopsy on Monday with Dr. Desouza.

 

At this time, patient has responded to 1 g of Solu-Medrol D, today's is third 
dose. Can evaluate his kidney function and pulmonary status closely, and 
hopefully, we will have the results of the ANCA and can base further management 
based his bronchoscopy findings, clinical condition, and results of workup.  
Patient's hemoptysis has significantly improved on the steroids and his renal 
function is gradually improving.

 

 [id number] #: [id number]

MTDD

## 2019-03-31 NOTE — PN
Subjective





- Subjective


Reason for Note: Progress Note


History: 





He is continuing to improve.  He no longer has a productive cough, chest pain 

or dyspnea.  He has some tenderness in his right lower quadrant.  The 

hyperglycemia continues despite doubling his insulin as a result of high dose 

steroids.  He has slept without a hangover effect from the temazepam 15 mg. He 

has a good appetite, no nausea or vomiting and his bowels are functioning.


Current Medications: 


 Current Medications





Acetaminophen (Tylenol Tab*)  650 mg PO Q6H PRN


   PRN Reason: pain/fever


Amlodipine Besylate (Norvasc Tab*)  5 mg PO BID [facility]


   Last Admin: 03/31/19 08:58 Dose:  5 mg


Aspirin (Aspirin Ec Tab*)  81 mg PO DAILY [facility]


   Last Admin: 03/31/19 08:59 Dose:  81 mg


Atorvastatin Calcium (Lipitor*)  40 mg PO DAILY [facility]; Protocol


   Last Admin: 03/31/19 08:58 Dose:  40 mg


Carvedilol (Coreg Tab*)  25 mg PO BID [facility]


   Last Admin: 03/31/19 08:57 Dose:  25 mg


Gabapentin (Neurontin Cap(*))  400 mg PO BEDTIME [facility]


   Last Admin: 03/30/19 21:34 Dose:  400 mg


Gabapentin (Neurontin Cap(*))  300 mg PO QAM [facility]


   Last Admin: 03/31/19 08:57 Dose:  300 mg


Insulin Glargine (Lantus(*))  30 units SUBCUT Q12H [facility]


   Last Admin: 03/30/19 23:55 Dose:  30 units


Insulin Human Lispro (Humalog*)  20 units SUBCUT AC [facility]


   Last Admin: 03/31/19 08:58 Dose:  20 units


Meclizine HCl (Antivert Tab*)  25 mg PO TID PRN


   PRN Reason: VERTIGO


Prochlorperazine Edisylate (Compazine Inj*)  5 mg IV Q6H PRN


   PRN Reason: NAUSEA/VOMITING


Temazepam (Restoril Cap*)  15 mg PO BEDTIME PRN


   PRN Reason: INSOMNIA








Home Medications: 


 Home Medications











 Medication  Instructions  Recorded  Confirmed  Type


 


Gabapentin CAP(*) [Neurontin 300 1 tab PO QAM 02/04/16 03/28/19 History





CAP(*)]    


 


Gabapentin CAP(*) [Neurontin 300 2 tab PO BEDTIME 02/04/16 03/28/19 History





CAP(*)]    


 


Glipizide 10 mg PO QAM 02/04/16 03/28/19 History


 


Metformin HCl [Glucophage] 1 tab PO BID 02/04/16 03/28/19 History


 


Amlodipine Besylate [Amlodipine 5 mg PO BID 02/05/16 03/28/19 History





Besylate-]    


 


Meclizine TAB* [Antivert 12.5 TAB*] 25 mg PO TID PRN #15 tab 03/13/16 03/28/19 

Rx


 


Aspirin [Adult Aspirin Regimen] 1 tab PO DAILY 03/21/19 03/28/19 History


 


Carvedilol TAB* [Coreg TAB*] 25 mg PO BID 03/21/19 03/28/19 History


 


Empagliflozin [Jardiance] 10 mg PO DAILY 03/21/19 03/28/19 History


 


Fenofibrate Nanocrystallized 145 mg PO DAILY 03/21/19 03/28/19 History





[Tricor]    


 


Insulin Glargine,Hum.rec.anlog 40 unit SUBCUT DAILY 03/21/19 03/28/19 History





[Basaglar Kwikpen U-100]    


 


Insulin LISPRO* [HumaLOG*] 10 units SUBCUT AC 03/21/19 03/28/19 History


 


Losartan TAB* [Cozaar TAB*] 25 mg PO DAILY 03/21/19 03/28/19 History


 


Magnesium Oxide [Magnesium] 1 tab PO DAILY 03/21/19 03/28/19 History


 


Omega-3 Acid Ethyl Esters [Lovaza] 1 gm PO BID 03/21/19 03/28/19 History


 


Rosuvastatin Calcium [Crestor] 20 mg PO DAILY 03/21/19 03/28/19 History











Allergies: 


 Allergies











Allergy/AdvReac Type Severity Reaction Status Date / Time


 


lisinopril AdvReac Intermediate Dizziness Verified 03/27/19 13:33














Objective





- Vital Signs


Vital Signs: 


 Vital Signs











  03/30/19 03/30/19 03/30/19





  10:00 11:13 14:56


 


Temperature 97.3 F 97.9 F 98.6 F


 


Pulse Rate 80 78 87


 


Respiratory 16 16 16





Rate   


 


Blood Pressure 151/81 145/76 151/85





(mmHg)   


 


O2 Sat by Pulse 95 96 93





Oximetry   














  03/30/19 03/30/19 03/30/19





  19:36 20:00 21:34


 


Temperature 97.5 F  


 


Pulse Rate 84  


 


Respiratory 16 16 20





Rate   


 


Blood Pressure 134/66  





(mmHg)   


 


O2 Sat by Pulse 93  





Oximetry   














  03/30/19 03/31/19 03/31/19





  23:45 03:32 03:42


 


Temperature 98 F 97.3 F 


 


Pulse Rate 83 81 


 


Respiratory 18 16 





Rate   


 


Blood Pressure 146/83 174/91 158/90





(mmHg)   


 


O2 Sat by Pulse 92 91 





Oximetry   














  03/31/19 03/31/19 03/31/19





  07:18 07:25 08:57


 


Temperature  98.4 F 


 


Pulse Rate  72 


 


Respiratory 16 17 16





Rate   


 


Blood Pressure  151/79 





(mmHg)   


 


O2 Sat by Pulse  96 





Oximetry   














- Intake and Output


Intake and Output: 


 Intake & Output











 03/28/19 03/29/19 03/30/19 03/31/19





 11:59 11:59 11:59 11:59


 


Intake Total  1250 2315 1180


 


Output Total  550 250 300


 


Balance  700 2065 880


 


Weight  248 lb 12.8 oz  


 


Intake:    


 


  IV Fluids  1000 995 


 


  IVPB    100


 


    PB - METHYLPREDNISOLONE    100


 


  Oral  250 1320 1080


 


Output:    


 


  Urine  550 250 300


 


Other:    


 


  Estimated Void  Medium  


 


  # Voids  1  





 





ADLs: Meal  Record                                         Start:  03/28/19 22:

35


Freq:   DAILY@0900,1400,1800                               Status: Active      

  


Protocol:                                                                      

  


 Created      03/28/19 22:35  System  (Rec: 03/28/19 22:35  System  [id number])


 Document     03/29/19 09:00  [id number]  (Rec: 03/29/19 10:19  [id number]  TELE-C07)


 Document     03/29/19 14:00  [id number]  (Rec: 03/29/19 15:10  [id number]  TELE-C07)


 Document     03/29/19 18:00  [id number]  (Rec: 03/29/19 18:59  [id number]  TELE-C07)


 Document     03/30/19 09:00  [id number]  (Rec: 03/30/19 15:19  [id number]  TELE-C05)


 Document     03/30/19 14:00  [id number]  (Rec: 03/30/19 15:20  [id number]  TELE-C05)


 Document     03/30/19 18:00  [id number]  (Rec: 03/30/19 18:10  [id number]  TELE-C05)


Intake and Output                                          Start:  03/28/19 17:

05


Freq:                                                      Status: Cancelled   

  


Protocol:                                                                      

  


 Created      03/28/19 17:05  System  (Rec: 03/28/19 17:05  System  [id number])


Intake and Output                                          Start:  03/28/19 22:

35


Freq:   DAILY@0600,1400,2200                               Status: Cancelled   

  


Protocol:                                                                      

  


 Created      03/28/19 22:35  System  (Rec: 03/28/19 22:35  System  [id number])


Intake and Output                                          Start:  03/28/19 22:

34


Freq:   06,14,2200                                         Status: Active      

  


Protocol:                                                                      

  


 Created      03/28/19 22:36  [id number]  (Rec: 03/28/19 22:36  BK  [id number])


 Document     03/29/19 06:00  [id number]  (Rec: 03/29/19 06:12  [id number]  TELE-C05)


 Document     03/29/19 14:00  [id number]  (Rec: 03/29/19 15:10  [id number]  TELE-C07)


 Document     03/29/19 22:00  [id number]  (Rec: 03/29/19 22:54  [id number]  TELE-C08)


 Document     03/30/19 06:00  [id number]  (Rec: 03/30/19 08:09  [id number]  TELE-C07)


 Document     03/30/19 14:00  [id number]  (Rec: 03/30/19 15:20  [id number]  TELE-C05)


 Document     03/30/19 22:00  [id number]  (Rec: 03/30/19 22:48  [id number]  TELE-C05)


 Document     03/31/19 06:00  [id number]  (Rec: 03/31/19 07:18  [id number]  TELE-C05)











- Physical Exam


General: No Cyanosis, No Anemia, No Jaundice, No Clubbing


Lungs and Chest: Yes: Chest Expansion Full, Chest Expansion Symetrica, 

Percussion Note Resonant, Vessicular Breath Sounds.  No: Crackles, Wheezes


Heart Rate and Rhythm: Normal


Additional Cardiovascular: Yes: Normal Heart Sounds.  No: Heart Murmur, Pedal 

Edema


Abdominal Exam: Yes: Soft, Abdominal Tenderness - slightest right lower quad, 

Bowel Sounds Present.  No: Distention - obese, Abdominal Mass, Guarding, 

Rebound Tenderness





- Extremities


Cranial Nerves II-XII Intact: Yes


Limbs: Normal Power, Normal Tone





- Neuro


Orientation: A/O x3


Speech: Normal





Results





- Results


Lab Results: 


 Laboratory Results - last 24 hr











  03/29/19 03/29/19 03/30/19





  06:16 20:05 11:51


 


WBC   


 


RBC   


 


Hgb   


 


Hct   


 


MCV   


 


MCH   


 


MCHC   


 


RDW   


 


Plt Count   


 


MPV   


 


Neut % (Auto)   


 


Lymph % (Auto)   


 


Mono % (Auto)   


 


Eos % (Auto)   


 


Baso % (Auto)   


 


Absolute Neuts (auto)   


 


Absolute Lymphs (auto)   


 


Absolute Monos (auto)   


 


Absolute Eos (auto)   


 


Absolute Basos (auto)   


 


Absolute Nucleated RBC   


 


Nucleated RBC %   


 


Sodium   


 


Potassium   


 


Chloride   


 


Carbon Dioxide   


 


Anion Gap   


 


BUN   


 


Creatinine   


 


Est GFR ( Amer)   


 


Est GFR (Non-Af Amer)   


 


BUN/Creatinine Ratio   


 


Glucose   


 


POC Glucose (mg/dL)    304 H


 


Calcium   


 


Total Bilirubin   


 


Direct Bilirubin   


 


Indirect Bilirubin   


 


AST   


 


ALT   


 


Alkaline Phosphatase   


 


C-Reactive Protein   


 


Total Protein   


 


Albumin   


 


Globulin   


 


Albumin/Globulin Ratio   


 


Anti-Nuclear Antibody  0.3  


 


Proteinase 3 (PR3)  < 0.2  


 


Myeloperoxidase Ab  < 0.2  


 


Glomerular Base Memb Ab  <0.2  


 


Hep Bs Antigen   Nonreactive 


 


Hepatitis C Antibody   Nonreactive 


 


Hepatitis C Ab Index   < 0.0 














  03/30/19 03/30/19 03/30/19





  16:34 20:56 23:49


 


WBC   


 


RBC   


 


Hgb   


 


Hct   


 


MCV   


 


MCH   


 


MCHC   


 


RDW   


 


Plt Count   


 


MPV   


 


Neut % (Auto)   


 


Lymph % (Auto)   


 


Mono % (Auto)   


 


Eos % (Auto)   


 


Baso % (Auto)   


 


Absolute Neuts (auto)   


 


Absolute Lymphs (auto)   


 


Absolute Monos (auto)   


 


Absolute Eos (auto)   


 


Absolute Basos (auto)   


 


Absolute Nucleated RBC   


 


Nucleated RBC %   


 


Sodium   


 


Potassium   


 


Chloride   


 


Carbon Dioxide   


 


Anion Gap   


 


BUN   


 


Creatinine   


 


Est GFR ( Amer)   


 


Est GFR (Non-Af Amer)   


 


BUN/Creatinine Ratio   


 


Glucose   


 


POC Glucose (mg/dL)  180 H  189 H  203 H


 


Calcium   


 


Total Bilirubin   


 


Direct Bilirubin   


 


Indirect Bilirubin   


 


AST   


 


ALT   


 


Alkaline Phosphatase   


 


C-Reactive Protein   


 


Total Protein   


 


Albumin   


 


Globulin   


 


Albumin/Globulin Ratio   


 


Anti-Nuclear Antibody   


 


Proteinase 3 (PR3)   


 


Myeloperoxidase Ab   


 


Glomerular Base Memb Ab   


 


Hep Bs Antigen   


 


Hepatitis C Antibody   


 


Hepatitis C Ab Index   














  03/31/19 03/31/19 03/31/19





  05:46 05:46 07:27


 


WBC  7.8  


 


RBC  4.30  


 


Hgb  13.2 L  


 


Hct  40  


 


MCV  93  


 


MCH  31  


 


MCHC  33  


 


RDW  15  


 


Plt Count  185  


 


MPV  9.7  


 


Neut % (Auto)  89.5  


 


Lymph % (Auto)  5.5  


 


Mono % (Auto)  4.9  


 


Eos % (Auto)  0  


 


Baso % (Auto)  0.1  


 


Absolute Neuts (auto)  7.0  


 


Absolute Lymphs (auto)  0.4 L  


 


Absolute Monos (auto)  0.4  


 


Absolute Eos (auto)  0  


 


Absolute Basos (auto)  0  


 


Absolute Nucleated RBC  0  


 


Nucleated RBC %  0  


 


Sodium   141 


 


Potassium   3.9 


 


Chloride   109 


 


Carbon Dioxide   20 L 


 


Anion Gap   12 H 


 


BUN   75 H 


 


Creatinine   2.69 H 


 


Est GFR ( Amer)   29.0 


 


Est GFR (Non-Af Amer)   23.9 


 


BUN/Creatinine Ratio   27.9 H 


 


Glucose   236 H 


 


POC Glucose (mg/dL)    235 H


 


Calcium   9.1 


 


Total Bilirubin   0.50 


 


Direct Bilirubin   0.10 


 


Indirect Bilirubin   0.4 


 


AST   30 


 


ALT   46 


 


Alkaline Phosphatase   30 L 


 


C-Reactive Protein   32.88 H 


 


Total Protein   6.4 


 


Albumin   3.7 


 


Globulin   2.7 


 


Albumin/Globulin Ratio   1.4 


 


Anti-Nuclear Antibody   


 


Proteinase 3 (PR3)   


 


Myeloperoxidase Ab   


 


Glomerular Base Memb Ab   


 


Hep Bs Antigen   


 


Hepatitis C Antibody   


 


Hepatitis C Ab Index   














Assessment





- Problem List


Assessment: 


Patient Problems





Acute renal failure (Acute)


Adverse drug effect (Acute)


Hemoptysis (Acute)


Pulmonary-renal syndrome (Acute)


Atherosclerosis (Chronic)


BMI over 35 (Chronic)


Diabetic neuropathy (Chronic)


Essential hypertension (Chronic)


Hyperlipidemia (Chronic)


Microalbuminuria due to type 2 diabetes mellitus (Chronic)


Type 2 diabetes mellitus (Chronic)








Plan: 





Acute renal failure (Acute)  His BUN remains high and his Cr is coming down.  I 

discussed his case with Dr. Palma and read her note.  He has negative anti-

basement membrane ab - making Goodpasture's syndrome less likely.  He has 

intrinsic renal failure as his fractional sodium excretion is in that range. 

However, there is no evidence of ATN on his urine microscopy.  He appears to be 

feeling better with the glucocorticoid therapy.


Adverse drug effect (Acute)  Whether the [id number] tetrafosmin is a 

time marker or the cause of his pulmonary-renal syndrome remains unclear.


Hemoptysis (Acute)Pulmonary-renal syndrome (Acute)  He is scheduled for a 

bronchoscopy tomorrow - since his pulmonary symptoms are so much better, I 

wonder if this will be a diagnostic exercise.


Type 2 diabetes mellitus (Chronic)  I will increase his insulin some more





Secondary diagnoses


Atherosclerosis (Chronic)


BMI over 35 (Chronic)


Diabetic neuropathy (Chronic)


Essential hypertension (Chronic)


Hyperlipidemia (Chronic)


Microalbuminuria due to type 2 diabetes mellitus (Chronic)








He seems improved today, though his renal function is clearly not back to 

baseline.  I explained the above to the patient and he agrees to the management 

plan.

## 2019-04-01 LAB
ALBUMIN SERPL-MCNC: 3 G/DL (ref 3.4–4.7)
ALBUMIN/GLOB SERPL: 0.91 {RATIO}
ANION GAP SERPL CALC-SCNC: 8 MMOL/L (ref 2–11)
BUN SERPL-MCNC: 81 MG/DL (ref 6–24)
BUN/CREAT SERPL: 34.2 (ref 8–20)
CALCIUM SERPL-MCNC: 9.2 MG/DL (ref 8.6–10.3)
CHLORIDE SERPL-SCNC: 110 MMOL/L (ref 101–111)
GAMMA GLOB SERPL ELPH-MCNC: 0.7 G/DL (ref 0.6–1.6)
GLUCOSE SERPL-MCNC: 227 MG/DL (ref 70–100)
HCO3 SERPL-SCNC: 23 MMOL/L (ref 22–32)
IMMUNOGLOBULIN A: 183 MG/DL (ref 61–356)
KAPPA LC FREE SER-MCNC: 4.21 MG/DL
LAMBDA LC FREE SERPL-MCNC: 1.77 MG/DL
POTASSIUM SERPL-SCNC: 4.2 MMOL/L (ref 3.5–5)
PROT SERPL-MCNC: 6.3 G/DL (ref 6.3–7.9)
SODIUM SERPL-SCNC: 141 MMOL/L (ref 135–145)

## 2019-04-01 RX ADMIN — AMLODIPINE BESYLATE SCH MG: 5 TABLET ORAL at 09:10

## 2019-04-01 RX ADMIN — GUAIFENESIN AND CODEINE PHOSPHATE PRN ML: 100; 10 SOLUTION ORAL at 17:59

## 2019-04-01 RX ADMIN — INSULIN GLARGINE SCH: 100 INJECTION, SOLUTION SUBCUTANEOUS at 13:01

## 2019-04-01 RX ADMIN — GABAPENTIN SCH: 300 CAPSULE ORAL at 08:33

## 2019-04-01 RX ADMIN — AMLODIPINE BESYLATE SCH MG: 5 TABLET ORAL at 20:20

## 2019-04-01 RX ADMIN — ASPIRIN SCH: 81 TABLET, COATED ORAL at 08:33

## 2019-04-01 RX ADMIN — GABAPENTIN SCH MG: 400 CAPSULE ORAL at 20:20

## 2019-04-01 RX ADMIN — INSULIN LISPRO SCH: 100 INJECTION, SOLUTION INTRAVENOUS; SUBCUTANEOUS at 08:33

## 2019-04-01 RX ADMIN — CARVEDILOL SCH: 25 TABLET, FILM COATED ORAL at 08:33

## 2019-04-01 RX ADMIN — ATORVASTATIN CALCIUM SCH: 40 TABLET, FILM COATED ORAL at 08:33

## 2019-04-01 RX ADMIN — INSULIN LISPRO SCH UNITS: 100 INJECTION, SOLUTION INTRAVENOUS; SUBCUTANEOUS at 16:46

## 2019-04-01 RX ADMIN — AMLODIPINE BESYLATE SCH: 5 TABLET ORAL at 08:33

## 2019-04-01 RX ADMIN — INSULIN LISPRO SCH: 100 INJECTION, SOLUTION INTRAVENOUS; SUBCUTANEOUS at 11:07

## 2019-04-01 RX ADMIN — INSULIN GLARGINE SCH UNITS: 100 INJECTION, SOLUTION SUBCUTANEOUS at 20:21

## 2019-04-01 RX ADMIN — CARVEDILOL SCH MG: 25 TABLET, FILM COATED ORAL at 20:20

## 2019-04-01 RX ADMIN — CARVEDILOL SCH MG: 25 TABLET, FILM COATED ORAL at 09:10

## 2019-04-01 RX ADMIN — ATORVASTATIN CALCIUM SCH MG: 40 TABLET, FILM COATED ORAL at 09:10

## 2019-04-01 NOTE — PRO
BRONCHOSCOPY REPORT:

 

DATE OF PROCEDURE:  04/01/19

 

PROCEDURE PERFORMED:  Bronchoscopy with transbronchial biopsy from the right 
lower lobe and bronchoalveolar lavage from right middle lobe.

 

PREPROCEDURAL DIAGNOSIS:  Acute renal failure along with lung nodules.

 

ANESTHESIA:  General anesthesia.

 

ANESTHESIOLOGIST:  Dr. Peoples.

 

DESCRIPTION OF PROCEDURE:  Informed consent was obtained from the patient prior 
to the procedure after all the risks and benefits were thoroughly explained 
including risk of pneumothorax.  The patient was intubated with size 8.0 
endotracheal tube. Appropriate time-out was agreed on by attending staff prior 
to the procedure.  A flexible Olympus bronchoscope was inserted through ET tube 
for airway inspection. ET tube positioning was confirmed to be 3 cm above the 
level of elio.  Thick secretions were noted on both sides and were suctioned.
  No endobronchial lesions were noted.  Redundant mucosal layer seen. 
Transbronchial biopsies were obtained under fluoro guidance.  PEEP was dropped 
and the patient was being manually bagged during that time.  Six passes were 
performed from different subsegments in the right lower lobe area.  Specimen 
was placed in formalin.  Bronchoalveolar lavage was obtained from right middle 
lobe area and was sent for micro and cytological exam.  The patient tolerated 
the procedure well. The patient was extubated and seen in optimal condition.

 

 [id number]/CPS #: [id number]

[facility]

## 2019-04-01 NOTE — PN
Subjective





- Subjective


Reason for Note: Discharge Note


History: 





Contingent discharge note:





He slept well. He is no longer coughing and has no sputum.  He is not dyspneic.

  He denies fevers or sweats.  He is eating and drinking normally.  He has no 

abdominal or back pain. He remains hyperglycemic.


Current Medications: 


 Current Medications





Acetaminophen (Tylenol Tab*)  650 mg PO Q6H PRN


   PRN Reason: pain/fever


Amlodipine Besylate (Norvasc Tab*)  5 mg PO BID [facility]


   Last Admin: 03/31/19 21:25 Dose:  5 mg


Aspirin (Aspirin Ec Tab*)  81 mg PO DAILY [facility]


   Last Admin: 03/31/19 08:59 Dose:  81 mg


Atorvastatin Calcium (Lipitor*)  40 mg PO DAILY [facility]; Protocol


   Last Admin: 03/31/19 08:58 Dose:  40 mg


Carvedilol (Coreg Tab*)  25 mg PO BID [facility]


   Last Admin: 03/31/19 21:25 Dose:  25 mg


Gabapentin (Neurontin Cap(*))  400 mg PO BEDTIME [facility]


   Last Admin: 03/31/19 21:24 Dose:  400 mg


Gabapentin (Neurontin Cap(*))  300 mg PO QAM [facility]


   Last Admin: 03/31/19 08:57 Dose:  300 mg


Sodium Chloride (Ns 0.9% 1000 Ml**)  1,000 mls @ 25 mls/hr IV PER RATE [facility]


Insulin Glargine (Lantus(*))  35 units SUBCUT Q12H [facility]


   Last Admin: 03/31/19 21:25 Dose:  35 units


Insulin Human Lispro (Humalog*)  20 units SUBCUT AC [facility]


   Last Admin: 03/31/19 17:19 Dose:  20 units


Meclizine HCl (Antivert Tab*)  25 mg PO TID PRN


   PRN Reason: VERTIGO


Prochlorperazine Edisylate (Compazine Inj*)  5 mg IV Q6H PRN


   PRN Reason: NAUSEA/VOMITING


Temazepam (Restoril Cap*)  15 mg PO BEDTIME PRN


   PRN Reason: INSOMNIA


   Last Admin: 03/31/19 21:30 Dose:  15 mg








Home Medications: 


 Home Medications











 Medication  Instructions  Recorded  Confirmed  Type


 


Gabapentin CAP(*) [Neurontin 300 1 tab PO QAM 02/04/16 03/28/19 History





CAP(*)]    


 


Gabapentin CAP(*) [Neurontin 300 2 tab PO BEDTIME 02/04/16 03/28/19 History





CAP(*)]    


 


Glipizide 10 mg PO QAM 02/04/16 03/28/19 History


 


Metformin HCl [Glucophage] 1 tab PO BID 02/04/16 03/28/19 History


 


Amlodipine Besylate [Amlodipine 5 mg PO BID 02/05/16 03/28/19 History





Besylate-]    


 


Meclizine TAB* [Antivert 12.5 TAB*] 25 mg PO TID PRN #15 tab 03/13/16 03/28/19 

Rx


 


Aspirin [Adult Aspirin Regimen] 1 tab PO DAILY 03/21/19 03/28/19 History


 


Carvedilol TAB* [Coreg TAB*] 25 mg PO BID 03/21/19 03/28/19 History


 


Empagliflozin [Jardiance] 10 mg PO DAILY 03/21/19 03/28/19 History


 


Fenofibrate Nanocrystallized 145 mg PO DAILY 03/21/19 03/28/19 History





[Tricor]    


 


Insulin Glargine,Hum.rec.anlog 40 unit SUBCUT DAILY 03/21/19 03/28/19 History





[Basaglar Kwikpen U-100]    


 


Insulin LISPRO* [HumaLOG*] 10 units SUBCUT AC 03/21/19 03/28/19 History


 


Losartan TAB* [Cozaar TAB*] 25 mg PO DAILY 03/21/19 03/28/19 History


 


Magnesium Oxide [Magnesium] 1 tab PO DAILY 03/21/19 03/28/19 History


 


Omega-3 Acid Ethyl Esters [Lovaza] 1 gm PO BID 03/21/19 03/28/19 History


 


Rosuvastatin Calcium [Crestor] 20 mg PO DAILY 03/21/19 03/28/19 History











Allergies: 


 Allergies











Allergy/AdvReac Type Severity Reaction Status Date / Time


 


lisinopril AdvReac Intermediate Dizziness Verified 03/27/19 13:33














Objective





- Vital Signs


Vital Signs: 


 Vital Signs











  03/31/19 03/31/19 03/31/19





  07:25 08:57 11:11


 


Temperature 98.4 F  98.5 F


 


Pulse Rate 72  78


 


Respiratory 17 16 16





Rate   


 


Blood Pressure 151/79  145/76





(mmHg)   


 


O2 Sat by Pulse 96  94





Oximetry   














  03/31/19 03/31/19 03/31/19





  19:57 19:58 21:24


 


Temperature  97.9 F 


 


Pulse Rate  75 


 


Respiratory 18 16 20





Rate   


 


Blood Pressure  156/88 





(mmHg)   


 


O2 Sat by Pulse  94 





Oximetry   














  03/31/19 03/31/19 03/31/19





  21:28 23:30 23:41


 


Temperature  97.7 F 


 


Pulse Rate  74 


 


Respiratory  16 18





Rate   


 


Blood Pressure 170/84 151/82 





(mmHg)   


 


O2 Sat by Pulse  93 





Oximetry   














  04/01/19





  06:08


 


Temperature 


 


Pulse Rate 


 


Respiratory 18





Rate 


 


Blood Pressure 





(mmHg) 


 


O2 Sat by Pulse 





Oximetry 














- Intake and Output


Intake and Output: 


 Intake & Output











 03/29/19 03/30/19 03/31/19 04/01/19





 11:59 11:59 11:59 11:59


 


Intake Total 1250 2315 1420 1180


 


Output Total 550 250 300 400


 


Balance 700 2065 1120 780


 


Weight 248 lb 12.8 oz   


 


Intake:    


 


  IV Fluids 1000 995  


 


  IVPB   100 100


 


    PB - METHYLPREDNISOLONE   100 100


 


  Oral 250 1320 1320 1080


 


Output:    


 


  Urine 550 250 300 400


 


Other:    


 


  Estimated Void Medium   


 


  # Voids 1   





 





ADLs: Meal  Record                                         Start:  03/28/19 22:

35


Freq:   DAILY@0900,1400,1800                               Status: Active      

  


Protocol:                                                                      

  


 Created      03/28/19 22:35  System  (Rec: 03/28/19 22:35  System  [id number])


 Document     03/29/19 09:00  [id number]  (Rec: 03/29/19 10:19  [id number]  TELE-C07)


 Document     03/29/19 14:00  [id number]  (Rec: 03/29/19 15:10  [id number]  TELE-C07)


 Document     03/29/19 18:00  [id number]  (Rec: 03/29/19 18:59  [id number]  TELE-C07)


 Document     03/30/19 09:00  [id number]  (Rec: 03/30/19 15:19  [id number]  TELE-C05)


 Document     03/30/19 14:00  [id number]  (Rec: 03/30/19 15:20  [id number]  TELE-C05)


 Document     03/30/19 18:00  [id number]  (Rec: 03/30/19 18:10  [id number]  TELE-C05)


 Document     03/31/19 09:00  [id number]  (Rec: 03/31/19 10:36  [id number]  TELE-C05)


 Document     03/31/19 14:00  [id number]  (Rec: 03/31/19 15:24  [id number]  TELE-C05)


 Document     03/31/19 18:00  [id number]  (Rec: 03/31/19 21:19  [id number]  TELE-C05)


Intake and Output                                          Start:  03/28/19 17:

05


Freq:                                                      Status: Cancelled   

  


Protocol:                                                                      

  


 Created      03/28/19 17:05  System  (Rec: 03/28/19 17:05  System  [id number])


Intake and Output                                          Start:  03/28/19 22:

35


Freq:   DAILY@0600,1400,2200                               Status: Cancelled   

  


Protocol:                                                                      

  


 Created      03/28/19 22:35  System  (Rec: 03/28/19 22:35  System  TELE-C07)


Intake and Output                                          Start:  03/28/19 22:

34


Freq:   06,14,2200                                         Status: Active      

  


Protocol:                                                                      

  


 Created      03/28/19 22:36  [id number]  (Rec: 03/28/19 22:36  BKG  [id number])


 Document     03/29/19 06:00  [id number]  (Rec: 03/29/19 06:12  [id number]  TELE-C05)


 Document     03/29/19 14:00  [id number]  (Rec: 03/29/19 15:10  [id number]  TELE-C07)


 Document     03/29/19 22:00  [id number]  (Rec: 03/29/19 22:54  [id number]  TELE-C08)


 Document     03/30/19 06:00  [id number]  (Rec: 03/30/19 08:09  [id number]  TELE-C07)


 Document     03/30/19 14:00  [id number]  (Rec: 03/30/19 15:20  [id number]  TELE-C05)


 Document     03/30/19 22:00  [id number]  (Rec: 03/30/19 22:48  [id number]  TELE-C05)


 Document     03/31/19 06:00  [id number]  (Rec: 03/31/19 07:18  [id number]  TELE-C05)


 Document     03/31/19 14:00  [id number]  (Rec: 03/31/19 15:24  [id number]  TELE-C05)


 Document     03/31/19 22:00  [id number]  (Rec: 03/31/19 22:21  [id number]  TELE-C05)


 Document     04/01/19 06:00  [id number]  (Rec: 04/01/19 07:17  [id number]  TELE-C01)











- Physical Exam


General: No Cyanosis, No Anemia, No Jaundice, No Clubbing


Lungs and Chest: Yes: Chest Expansion Full, Chest Expansion Symetrica, 

Percussion Note Resonant, Vessicular Breath Sounds.  No: Crackles, Wheezes


Heart Rate and Rhythm: Regular


Additional Cardiovascular: Yes: Normal Heart Sounds.  No: Heart Murmur, Pedal 

Edema


Abdominal Exam: Yes: Soft, Bowel Sounds Present.  No: Distention, Abdominal 

Tenderness





Results





- Results


Lab Results: 


 Laboratory Results - last 24 hr











  03/31/19 03/31/19 03/31/19





  07:27 11:23 16:19


 


Sodium   


 


Potassium   


 


Chloride   


 


Carbon Dioxide   


 


Anion Gap   


 


BUN   


 


Creatinine   


 


Est GFR ( Amer)   


 


Est GFR (Non-Af Amer)   


 


BUN/Creatinine Ratio   


 


Glucose   


 


POC Glucose (mg/dL)  235 H  283 H  194 H


 


Calcium   


 


C-Reactive Protein   














  03/31/19 04/01/19





  21:15 05:43


 


Sodium   141


 


Potassium   4.2


 


Chloride   110


 


Carbon Dioxide   23


 


Anion Gap   8


 


BUN   81 H


 


Creatinine   2.37 H


 


Est GFR ( Amer)   33.5


 


Est GFR (Non-Af Amer)   27.7


 


BUN/Creatinine Ratio   34.2 H


 


Glucose   227 H


 


POC Glucose (mg/dL)  179 H 


 


Calcium   9.2


 


C-Reactive Protein   17.68 H














Assessment





- Problem List


Assessment: 


Patient Problems





Acute renal failure (Acute)


Adverse drug effect (Acute)


Hemoptysis (Acute)


Pulmonary-renal syndrome (Acute)


Atherosclerosis (Chronic)


BMI over 35 (Chronic)


Diabetic neuropathy (Chronic)


Essential hypertension (Chronic)


Hyperlipidemia (Chronic)


Microalbuminuria due to type 2 diabetes mellitus (Chronic)


Type 2 diabetes mellitus (Chronic)








Plan: 





Pulmonary-renal syndrome (Acute)  The CRP is coming down dramatically, 

suggesting a good result from the methylprednisone.  He is feeling much improved


Acute renal failure (Acute)  His creatinine is improving, but  his BUN is 

increasing.  I am not sure as to the mechanism.  It is possible he has had a 

lot of tissue damage from this acute inflammatory episode and that this protein 

has been broken down and excreted.  I note his urine output remains low.


Adverse drug effect (Acute)  This remains speculation - we need the results of 

the bronchoscopy today


Hemoptysis (Acute)  He has no further problems


Type 2 diabetes mellitus (Chronic)  He remains hyperglycemic, I will need to 

taper his insulin therapy to prevent hypoglycemia





Secondary diagnoses


Atherosclerosis (Chronic)


BMI over 35 (Chronic)


Diabetic neuropathy (Chronic)


Essential hypertension (Chronic)  His BP is running higher than usual


Hyperlipidemia (Chronic)


Microalbuminuria due to type 2 diabetes mellitus (Chronic)





I will discuss his case with Dr. Fournier and decide if he can return home today 

with short follow up or whether this renal injury requires further inpatient 

evaluation.  I explained the above to the patient

## 2019-04-01 NOTE — PN
Progress Note





- Progress Note


Date of Service: 04/01/19 - Pulmonary f/u note


Note: 





Pt seen and examined at bedside.  Pt reports feeling better. No hemoptysis 

episodes. No new complaints


 Active Medications











Generic Name Dose Route Start Last Admin





  Trade Name Freq  PRN Reason Stop Dose Admin


 


Acetaminophen  650 mg  03/28/19 22:30  





  Tylenol Tab*  PO   





  Q6H PRN   





  pain/fever   





     





     





     


 


Amlodipine Besylate  5 mg  03/29/19 03:00  04/01/19 09:10





  Norvasc Tab*  PO   5 mg





  BID MOISÉS   Administration





     





     





     





     


 


Aspirin  81 mg  03/29/19 09:00  04/01/19 08:33





  Aspirin Ec Tab*  PO   Not Given





  DAILY MOISÉS   





     





     





     





     


 


Atorvastatin Calcium  40 mg  03/29/19 09:00  04/01/19 09:10





  Lipitor*  PO   40 mg





  DAILY MOISÉS   Administration





     





     





  Protocol   





     


 


Carvedilol  25 mg  03/29/19 03:00  04/01/19 09:10





  Coreg Tab*  PO   25 mg





  BID MOISÉS   Administration





     





     





     





     


 


Gabapentin  400 mg  03/29/19 21:00  03/31/19 21:24





  Neurontin Cap(*)  PO   400 mg





  BEDTIME MOISÉS   Administration





     





     





     





     


 


Gabapentin  300 mg  03/29/19 03:00  04/01/19 08:33





  Neurontin Cap(*)  PO   Not Given





  QAM MOISÉS   





     





     





     





     


 


Sodium Chloride  1,000 mls @ 25 mls/hr  04/01/19 06:00  





  Ns 0.9% 1000 Ml**  IV   





  PER RATE MOISÉS   





     





     





     





     


 


Insulin Glargine  35 units  03/31/19 10:00  03/31/19 21:25





  Lantus(*)  SUBCUT   35 units





  Q12H MOISÉS   Administration





     





     





     





     


 


Insulin Human Lispro  20 units  03/30/19 11:30  04/01/19 11:07





  Humalog*  SUBCUT   Not Given





  AC MOISÉS   





     





     





     





     


 


Meclizine HCl  25 mg  03/28/19 22:28  





  Antivert Tab*  PO   





  TID PRN   





  VERTIGO   





     





     





     


 


Prochlorperazine Edisylate  5 mg  03/28/19 22:30  





  Compazine Inj*  IV   





  Q6H PRN   





  NAUSEA/VOMITING   





     





     





     


 


Temazepam  15 mg  03/30/19 11:08  03/31/19 21:30





  Restoril Cap*  PO   15 mg





  BEDTIME PRN   Administration





  INSOMNIA   





     





     





     








 Vital Signs











Temp Pulse Resp BP Pulse Ox


 


 96.8 F   61   16   147/79   97 


 


 04/01/19 07:45  04/01/19 07:45  04/01/19 07:45  04/01/19 07:45  04/01/19 07:45








O/E: Pt in NAD


HEENT: PERRLA, MP-4


Lungs: Diminished air entry b/l


CVS: S1, S2+, regular


Abd: Soft, BS+


Ext: Normal ROM


Skin,: No rash


Neuro: No focal deficits





 Laboratory Results - last 24 hr











  03/31/19 03/31/19 04/01/19





  16:19 21:15 05:43


 


Sodium    141


 


Potassium    4.2


 


Chloride    110


 


Carbon Dioxide    23


 


Anion Gap    8


 


BUN    81 H


 


Creatinine    2.37 H


 


Est GFR ( Amer)    33.5


 


Est GFR (Non-Af Amer)    27.7


 


BUN/Creatinine Ratio    34.2 H


 


Glucose    227 H


 


POC Glucose (mg/dL)  194 H  179 H 


 


Calcium    9.2


 


C-Reactive Protein    17.68 H














  04/01/19





  07:24


 


Sodium 


 


Potassium 


 


Chloride 


 


Carbon Dioxide 


 


Anion Gap 


 


BUN 


 


Creatinine 


 


Est GFR ( Amer) 


 


Est GFR (Non-Af Amer) 


 


BUN/Creatinine Ratio 


 


Glucose 


 


POC Glucose (mg/dL)  219 H


 


Calcium 


 


C-Reactive Protein 








I/R: 64 y o obese m with h/o DM a/w hemoptysis, SOB, found ot have ARF and 

nodular opacities in lung


Pulmonary renal syndrome- Wegners high in differential however ANCA within 

normal limits


CRP significantly elevated


Was started on pulse dose steroids


CRP trending down, renal function slightly improved


Will perform bronchoscopy with transbronchial biopsy on right side


procedure was discussed in detail


Associated risks were discussed


Risk of PTX was discussed


Pt agreeable to procedure


Possible d/c today if stable after bronchoscopy

## 2019-04-01 NOTE — DS
CC:  Dr. Misty Palma; Dr. Rita Desouza *

 

DISCHARGE SUMMARY:

 

DATE OF ADMISSION:  19

 

DATE OF DISCHARGE:  19

 

DISCHARGE DIAGNOSES:

1.  Acute pulmonary-renal syndrome, likely adverse reaction to technetium-99 
tetrofosmin.

2.  Acute renal failure, likely glomerulonephritis.

3.  Pulmonary infiltrates.

 

COMORBIDITIES:

1.  Exacerbated hyperglycemia from type 2 diabetes mellitus.

2.  Dyspnea.

3.  Hemoptysis.

 

SECONDARY DIAGNOSES:

1.  Type 2 diabetes mellitus.

2.  Essential hypertension.

3.  Morbid obesity.

4.  Hyperlipidemia.

5.  Microalbuminuria.

6.  Coronary artery disease.

 

PROCEDURES:  Bronchoscopic biopsy.

 

HISTORY:  Pedro Glass is a 64-year-old white male.  His presentation is 
documented in Dr. Savannah Carvajal's admitting history and physical.  In short, 
on 19, he was feeling well.  He underwent a nuclear medicine resting 
phase scan of his heart using technetium-99 tetrofosmin.  He noted over the 
next few days chest pressure, cough, hemoptysis, and dark urine.  Two days later
, on 19, he underwent the Lexiscan portion of the test.  He presented to 
the emergency room the next day at his son's urging owing to increasing 
shortness of breath and further hemoptysis.

 

Examination at presentation showed a temperature 99.1, heart rate 87, 
respirations 17, oxygen saturation 91% on room air, blood pressure 159/96.  
Chest exam was normal.  Abdomen was normal.  No other focal findings.

 

DIAGNOSTIC STUDIES/LAB DATA:  Initial labs:  CBC:  White count 5.4, hemoglobin 
14.1, hematocrit 41, platelets 149, 65% neutrophils.  INR 1.08.  Chemistry 
showed a BUN of 53, a creatinine of 3.6, glucose 146, lactic acid 0.5.  
Bilirubin 0.7, AST of 51, ALT 35, ALP of 26.  Troponins were negative.  BNP 
383.  TSH 1.6. Urinalysis:  1+ protein, 2+ blood, trace wbc's.

 

On 19, stress test showed diaphragmatic attenuation.  No reversible 
changes.

 

EKG, 19, sinus rhythm at 80, inferior Q wave, no significant change.

 

INITIAL IMPRESSION:

1.  Acute kidney injury.

2.  Hemoptysis.

3.  Type 2 diabetes mellitus.

 

CONSULTATIONS:  Dr. Rita Desouza, 19.  Her report is part of the 
electronic medical record.  Impression:  Hemoptysis, acute renal failure 
suggestive of pulmonary-renal syndrome.  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 
associated vasculitis.  She considered a scheduled bronchoscopy.

 

Consultation by Dr. Misty Palma on 19.  Again, this is part of the 
electronic medical records.  In short, she felt he has an acute kidney injury 
with possibly rapidly progressive glomerulonephritis secondary to pulmonary-
renal disease like Wegener's granulomatosis/ANCA vasculitis/Goodpasture's 
syndrome.  She suggested further evaluation and also recommended pulse steroids 
and discussed the possible for a kidney biopsy if the other tests were all 
negative including the bronchoscopy.

 

OTHER LABORATORY INVESTIGATIONS:  On 19, ESR was 56, eosinophil smear of 
his urine negative.  C-reactive protein on 19 was 125.56, on 19 was 
72, on 19 was 33, on 19 was 18.  Creatinine series on 19 
creatinine was 3.66, it came down steadily on the day of discharge to 2.37.  I 
note, however, that the BUN showed no such downward trend starting on 19 
it was 53 and on 19 it was 81.

 

SPEP:  No monoclonal band.  Angiotensin converting enzyme 11.  TSH 1.61.  
Troponin I series was negative.  Liver function tests:  His AST was mildly 
elevated peaking at 63, ALT peaked at 59, alkaline phosphatase peaked to 31.

 

Fractional sodium excretion 1.79 suggestive of intrinsic renal disease. 
Microalbumin-to-creatinine ratio 112.  Toxicology screen negative.  Immunology: 
Rheumatoid factor 16, ROMI 0.3, proteinase 3 less than 0.2, and myeloperoxidase 
less than 0.2.  Glomerular basement membrane antibody less than 0.2.  Serology: 
Hepatitis surface antigen and C antibody were negative.

 

DIAGNOSTIC STUDIES:  Imaging:  Chest x-ray said to be consistent with CHF.

 

Renal ultrasound:  No hydronephrosis or visible calculi.

 

Chest CAT scan without contrast:  Small bilateral pleural effusions, right 
perihilar patchy alveolar opacity, borderline mediastinal adenopathy.  After 
bronchoscopy, there was no evidence of pneumothorax.

 

The bronchoalveolar lavage on the right was negative for malignant cells.  The 
transbronchial biopsy is pending.

 

HOSPITAL COURSE:  Pedro Glass presented with an unusual history.  He has had 
a chemistry profile most recently as an outpatient on 19 with a 
creatinine of 0.97 and a BUN of 17, but by 19, he had developed acute 
renal failure.  This was following only 1 new intervention, which was a Myoview/
Lexiscan study of his heart.

 

Initially, both Dr. Desouza and Dr. Palma felt that he was mostly to have a 
pulmonary-renal syndrome, which was autoimmune or autoinflammatory either ANCA 
vasculitis or Goodpasture syndrome.  The latter of which was ruled out.  ANCA 
is pending.

 

We treated him with a single infusion of 1 mg of methylprednisolone, which was 
coincidental with a fairly rapid improvement of his pulmonary symptoms and his 
hemoptysis discontinued, his cough went away and his chest pain improved.  He 
had a slow improvement of his renal function; however, this was not close to 
baseline by the day of discharge.

 

He underwent a bronchoscopy, which was uneventful.

 

On the day of discharge, he is feeling much better.  He is sleeping well.  His 
energy is returning.  He has some appetite.  He denies a cough, dyspnea, chest 
pain.  He has had hyperglycemia as a consequence of the steroid pulse.

 

PHYSICAL EXAMINATION ON THE DAY OF DISCHARGE:  Vital Signs:  Temperature 97.3, 
heart rate 70, respirations 20, oxygen saturation 90%, blood pressure 141/78.  
No cyanosis, anemia, jaundice, clubbing, or lymphadenopathy.  Cardiovascular 
System: Pulse regular, normal character and volume.  Heart sounds were normal.  
No added sounds or murmurs.  No pedal edema.  Respiratory System:  Chest 
expansion full and symmetrical.  Percussion not resonant.  Breath sounds 
vesicular.  No crackles or wheezes.  Abdomen:  Obese.  No masses, tenderness, 
or organomegaly.  Nervous System:  Alert and oriented.  Conjugate eye 
movements.  Normal speech.  Arms and legs full power and normal tone.

 

ASSESSMENT AND PLAN:

1.  Pulmonary-renal syndrome.  This came on acutely and after a dose of 
technetium- 99 tetrofosmin.  There is nothing in the world of literature that 
describes any such adverse effect.  So if indeed this is causation and not 
coincidence, this is unreported.  Unusually, it appears to be improving, but 
having been very sensitive to steroids, we still wonder what the nature of the 
lung and kidney damage happens to be.  If the bronchoscopy is negative and the 
ANCA is negative, we will consider a kidney biopsy.  By the day of discharge, 
his lungs are recovered to the point where he is no longer symptomatic.  His 
kidneys are still compromised, but his electrolytes are stable.  We are 
continuing to hold his antihypertensives.

2.  Type 2 diabetes mellitus is exacerbated by the IV steroids.

3.  Coronary artery disease.  His recent stress test was negative and there is 
no evidence of any acute ischemic problems with the heart.

4.  Obesity.  This is a chronic problem that needs addressing.

 

FOLLOWUP:  He will follow up at my outpatient office within a couple days of 
discharge and also with Nephrology.

 

DISCHARGE MEDICATIONS:

1.  Glipizide 10 mg q.a.m.

2.  Gabapentin 300 mg q.a.m.

3.  Meclizine 25 mg t.i.d. as needed for vertigo.

4.  Fenofibrate 145 mg every day.

5.  Omega-3 fatty acids.

6.  Lovaza 1 g twice daily.

7.  Carvedilol 25 mg twice daily.

8.  Glargine insulin 40 units daily.

 

We are holding his metformin as well with the renal insufficiency.

 

 [id number]/[facility] #: [id number]



ADDENDUM 4/3/2019 07:50



His bronchoscopy was complicated.  He developed a cough and O2 desaturation 
after the procedure followed by hemoptysis.  We kept him in the hospital for 2 
extra nights to observe him.  A chest X-ray revealed opacification of the right 
middle lobe.  He was seen in consultation by Dr. Rita Desouza who noted that 
this was the lobe that she chose for brocho-alveolar lavage.  

Yesterday, the coughing stopped, he had no further hemoptysis.  His O2 
saturation improved.  

This morning he has no chest pain, dyspnea on room air, sputum, hemoptysis.  He 
has no fevers or chills.  

Physical examination:

Temperature  98.5F Pulse 73 Resp 19 O2 saturation room air 91%  /82

He is warm and well perfused. He is comfortable, in no distress and 
hemodynamically stable.

CVS: Pulse regular, normal character/volume.  Heart sounds normal, No edema

Resp:  Chest expansion full and symmetrical, Percussion note dull right 
posterior chest.  Coarse crackles present posterior right chest.  No other 
wheezing or crackles. No respiratory distress, not using accessory muscles of 
respiration

Abdo:  obese, no distension, tenderness, masses or organomegaly

Ix: 







PEDRO GLASS 1955 [id number]













PEDRO GLASS                                                   [id number]
[id number]                  Page: 2 of 2













 [facility]

 [address]

 [place] [address]

 Ph.#: [phone]   Fax#: [phone]

 [facility] # [id number]

 Navid Mcrae M.D. Director of Laboratory



 SURGICAL PATHOLOGY REPORT











PEDRO GLASS                                                   [id number]
[id number]                  Page: 1 of 2











Patient Name: PEDRO GLASS Pathology Number: [id number]

 

Med.Rec. #: [id number] Collection Date: 19

 

Acct. Number#: [id number] Received Date: 19

 

: 1955 Location: [facility]

 

Gender: M Status: [facility]

 

Submitting Physician: Rita Desouza MD                    

 

Other Physician:  

 

                              







 







 FINAL DIAGNOSIS





Lung, right lower lobe, transbronchial biopsy:

-- Benign respiratory mucosa with no significant pathologic abnormalities.

Patient Name:         PEDRO GLASS                                   
                        Medical Record#: [id number]

Ordering Physician: Haris Hernandez MD                                                
                        Acct.#: [id number]

:     1955         Age: 64   Sex: M                                   
                        Location: [address] [place] [address] MEDICAL/TELEMETRY

Exam Date: 19                                                       
                        ADM Status: ADM IN

Order Information:                         CHEST AP OR PORT

Accession Number:                          [id number]

CPT:                                       72046

Indication: Crackles in the lung hypoxia.



Single frontal view of the chest performed at 0435 hours was reviewed.



Comparison is made with previous exam dated 2019.



Right middle lobe consolidation has progressed. Linear atelectasis in the left 
lung base.

This is consistent with pneumonia.



IMPRESSION: PROGRESSIVE PNEUMONIA IN THE RIGHT MIDDLE LOBE. NO PLEURAL FLUID IS

IDENTIFIED.





____________________________________________________________

<Electronically signed by Zohreh Melton MD in OV>  19 075

Dictated By: Zohreh Melton MD

Dictated Date/Time: 19 0758

Transcribed Date/Time: 19 0757

Copy to

                                                                 





Assessment/Plan



1.  Brochoscopy with complications:  He has congestion of the right middle lobe 
following brochio-alveolar lavage.  He has no evidence of infection.  He does 
not require antibacterials. I spoke with Dr. Desouza - she think he should have 
some furosemide in case he has some volume overload.  I will give him a tablet 
of furosemide 20 mg prior to discharge. I will follow this further as an 
outpatient.

2.  Pulmonary renal syndrome:  C-ANCA and glomerular basement  membrane 
antibodies are negative.  Hence 

3.  Hypoglycemia - he initially developed insulin resistance after treatment 
with gluocorticoids.  This has now receded and he is insulin sensitive once 
more.  He is eating less than usual and hence was hypoglycemia.  He will reduce 
his long acting insulin after discharge and increase as needed over the next 
few days.

4.  Hypertension - I will follow his BP as an outpatient and re-introduce his 
anti-hypertensives.



I discussed this with the patient and he agrees with the management plan.
LEMUEL

## 2019-04-02 LAB
ANION GAP SERPL CALC-SCNC: 10 MMOL/L (ref 2–11)
ANION GAP SERPL CALC-SCNC: 7 MMOL/L (ref 2–11)
BASOPHILS # BLD AUTO: 0 10^3/UL (ref 0–0.2)
BUN SERPL-MCNC: 77 MG/DL (ref 6–24)
BUN SERPL-MCNC: 78 MG/DL (ref 6–24)
BUN/CREAT SERPL: 32.8 (ref 8–20)
BUN/CREAT SERPL: 35.6 (ref 8–20)
CALCIUM SERPL-MCNC: 9.1 MG/DL (ref 8.6–10.3)
CALCIUM SERPL-MCNC: 9.2 MG/DL (ref 8.6–10.3)
CHLORIDE SERPL-SCNC: 110 MMOL/L (ref 101–111)
CHLORIDE SERPL-SCNC: 111 MMOL/L (ref 101–111)
EOSINOPHIL # BLD AUTO: 0 10^3/UL (ref 0–0.6)
GLUCOSE SERPL-MCNC: 103 MG/DL (ref 70–100)
GLUCOSE SERPL-MCNC: 92 MG/DL (ref 70–100)
HCO3 SERPL-SCNC: 23 MMOL/L (ref 22–32)
HCO3 SERPL-SCNC: 25 MMOL/L (ref 22–32)
HCT VFR BLD AUTO: 41 % (ref 36–46)
HGB BLD-MCNC: 13.9 G/DL (ref 14–18)
LYMPHOCYTES # BLD AUTO: 0.6 10^3/UL (ref 1–4.8)
MCH RBC QN AUTO: 32 PG (ref 27–31)
MCHC RBC AUTO-ENTMCNC: 34 G/DL (ref 31–36)
MCV RBC AUTO: 94 FL (ref 80–94)
MONOCYTES # BLD AUTO: 0.7 10^3/UL (ref 0–0.8)
NEUTROPHILS # BLD AUTO: 9.2 10^3/UL (ref 1.5–7.7)
NRBC # BLD AUTO: 0 10^3/UL
NRBC BLD QL AUTO: 0
PLATELET # BLD AUTO: 195 10^3/UL (ref 150–450)
POTASSIUM SERPL-SCNC: 4 MMOL/L (ref 3.5–5)
POTASSIUM SERPL-SCNC: 4.1 MMOL/L (ref 3.5–5)
RBC # BLD AUTO: 4.4 10^6 /UL (ref 4.18–5.48)
SODIUM SERPL-SCNC: 143 MMOL/L (ref 135–145)
SODIUM SERPL-SCNC: 143 MMOL/L (ref 135–145)
WBC # BLD AUTO: 10.7 10^3/UL (ref 3.5–10.8)

## 2019-04-02 RX ADMIN — ATORVASTATIN CALCIUM SCH MG: 40 TABLET, FILM COATED ORAL at 08:06

## 2019-04-02 RX ADMIN — INSULIN GLARGINE SCH UNIT: 100 INJECTION, SOLUTION SUBCUTANEOUS at 10:26

## 2019-04-02 RX ADMIN — AMLODIPINE BESYLATE SCH MG: 5 TABLET ORAL at 19:44

## 2019-04-02 RX ADMIN — GABAPENTIN SCH MG: 400 CAPSULE ORAL at 19:44

## 2019-04-02 RX ADMIN — CARVEDILOL SCH MG: 25 TABLET, FILM COATED ORAL at 19:44

## 2019-04-02 RX ADMIN — INSULIN LISPRO SCH UNITS: 100 INJECTION, SOLUTION INTRAVENOUS; SUBCUTANEOUS at 11:47

## 2019-04-02 RX ADMIN — INSULIN LISPRO SCH: 100 INJECTION, SOLUTION INTRAVENOUS; SUBCUTANEOUS at 16:25

## 2019-04-02 RX ADMIN — GUAIFENESIN AND CODEINE PHOSPHATE PRN ML: 100; 10 SOLUTION ORAL at 18:09

## 2019-04-02 RX ADMIN — CARVEDILOL SCH MG: 25 TABLET, FILM COATED ORAL at 08:06

## 2019-04-02 RX ADMIN — ATORVASTATIN CALCIUM SCH: 40 TABLET, FILM COATED ORAL at 08:07

## 2019-04-02 RX ADMIN — AMLODIPINE BESYLATE SCH MG: 5 TABLET ORAL at 08:06

## 2019-04-02 RX ADMIN — ASPIRIN SCH MG: 81 TABLET, COATED ORAL at 08:05

## 2019-04-02 RX ADMIN — GABAPENTIN SCH MG: 300 CAPSULE ORAL at 08:05

## 2019-04-02 RX ADMIN — INSULIN LISPRO SCH: 100 INJECTION, SOLUTION INTRAVENOUS; SUBCUTANEOUS at 07:41

## 2019-04-02 RX ADMIN — ALBUTEROL SULFATE SCH MG: 2.5 SOLUTION RESPIRATORY (INHALATION) at 18:46

## 2019-04-02 NOTE — PN
Subjective





- Subjective


Reason for Note: Discharge Note


History: 





Addendum to discharge summary:





He developed shortness of breath, hypoxemia and hemoptysis/coughing after the 

bronchoscopy.  I kept him overnight.  This worsened, so I checked a CXR that 

shows opacification of the right middle lobe.  


This morning he is feeling more comfortable, he is coughing less and has had no 

further hemoptysis.  He continues to be dependent on O2 by NC.  


He had a hypoglycemic episode.  


He has had muscle distress from atorvastatin.


Active Problems: 


 Active Problems





Right middle lobe pulmonary infiltrate (Acute) R91.8


Status post bronchoscopy (Acute) Z98.890








Current Medications: 


 Current Medications





Acetaminophen (Tylenol Tab*)  650 mg PO Q6H PRN


   PRN Reason: pain/fever


Amlodipine Besylate (Norvasc Tab*)  5 mg PO BID [facility]


   Last Admin: 19 08:06 Dose:  5 mg


Aspirin (Aspirin Ec Tab*)  81 mg PO DAILY [facility]


   Last Admin: 19 08:05 Dose:  81 mg


Atorvastatin Calcium (Lipitor*)  40 mg PO DAILY [facility]; Protocol


   Last Admin: 19 08:07 Dose:  Not Given


Carvedilol (Coreg Tab*)  25 mg PO BID [facility]


   Last Admin: 19 08:06 Dose:  25 mg


Gabapentin (Neurontin Cap(*))  400 mg PO BEDTIME [facility]


   Last Admin: 19 20:20 Dose:  400 mg


Gabapentin (Neurontin Cap(*))  300 mg PO QAM [facility]


   Last Admin: 19 08:05 Dose:  300 mg


Guaifenesin/Codeine Phosphate (Robitussin Ac 100mg-10mg*)  5 ml PO Q4H PRN


   PRN Reason: COUGH


   Last Admin: 19 17:59 Dose:  5 ml


Sodium Chloride (Ns 0.9% 1000 Ml**)  1,000 mls @ 25 mls/hr IV PER RATE [facility]


Insulin Glargine (Lantus(*))  35 units SUBCUT Q12H [facility]


   Last Admin: 19 20:21 Dose:  35 units


Insulin Human Lispro (Humalog*)  20 units SUBCUT AC [facility]


   Last Admin: 19 07:41 Dose:  Not Given


Meclizine HCl (Antivert Tab*)  25 mg PO TID PRN


   PRN Reason: VERTIGO


Prochlorperazine Edisylate (Compazine Inj*)  5 mg IV Q6H PRN


   PRN Reason: NAUSEA/VOMITING


Temazepam (Restoril Cap*)  15 mg PO BEDTIME PRN


   PRN Reason: INSOMNIA


   Last Admin: 19 21:30 Dose:  15 mg








Home Medications: 


 Home Medications











 Medication  Instructions  Recorded  Confirmed  Type


 


Gabapentin CAP(*) [Neurontin 300 1 tab PO QAM 16 History





CAP(*)]    


 


Gabapentin CAP(*) [Neurontin 300 2 tab PO BEDTIME 16 History





CAP(*)]    


 


Glipizide 10 mg PO QAM 16 History


 


Meclizine TAB* [Antivert 12.5 TAB*] 25 mg PO TID PRN #15 tab 16 

Rx


 


Aspirin [Adult Aspirin Regimen] 1 tab PO DAILY 19 History


 


Carvedilol TAB* [Coreg TAB*] 25 mg PO BID 19 History


 


Fenofibrate Nanocrystallized 145 mg PO DAILY 19 History





[Tricor]    


 


Insulin Glargine,Hum.rec.anlog 40 unit SUBCUT DAILY 19 History





[Basaglar Kwikpen U-100]    


 


Insulin LISPRO* [HumaLOG*] 10 units SUBCUT AC 19 History


 


Omega-3 Acid Ethyl Esters [Lovaza] 1 gm PO BID 19 History


 


Rosuvastatin Calcium [Crestor] 20 mg PO DAILY 19 History











Allergies: 


 Allergies











Allergy/AdvReac Type Severity Reaction Status Date / Time


 


lisinopril AdvReac Intermediate Dizziness Verified 19 13:33














Objective





- Vital Signs


Vital Signs: 


 Vital Signs











  19





  12:48 12:49 12:51


 


Temperature 97.3 F  


 


Pulse Rate 76 76 75


 


Respiratory 16  21





Rate   


 


Blood Pressure  134/70 134/69





(mmHg)   


 


O2 Sat by Pulse 93 94 94





Oximetry   














  19





  12:55 13:00 13:05


 


Temperature   


 


Pulse Rate 76 76 75


 


Respiratory 15 17 20





Rate   


 


Blood Pressure 133/76 134/74 126/68





(mmHg)   


 


O2 Sat by Pulse 95 92 88





Oximetry   














  19





  13:10 13:43 16:00


 


Temperature  97.3 F 97.8 F


 


Pulse Rate 72 70 77


 


Respiratory 14 20 16





Rate   


 


Blood Pressure 134/77 141/78 141/72





(mmHg)   


 


O2 Sat by Pulse 94 90 91





Oximetry   














  19





  16:54 20:00 20:20


 


Temperature 98.1 F  


 


Pulse Rate 76  


 


Respiratory 18 16 16





Rate   


 


Blood Pressure 160/76  





(mmHg)   


 


O2 Sat by Pulse 91  





Oximetry   














  19





  22:20 03:30 08:05


 


Temperature  97.8 F 


 


Pulse Rate  63 


 


Respiratory 16 20 19





Rate   


 


Blood Pressure  132/69 





(mmHg)   


 


O2 Sat by Pulse  89 





Oximetry   














  19





  08:07


 


Temperature 97.5 F


 


Pulse Rate 63


 


Respiratory 19





Rate 


 


Blood Pressure 127/67





(mmHg) 


 


O2 Sat by Pulse 96





Oximetry 














- Intake and Output


Intake and Output: 


 Intake & Output











 19





 11:59 11:59 11:59 11:59


 


Intake Total 2315 1420 1180 700


 


Output Total 250 300 400 0


 


Balance 2065 1120 780 700


 


Intake:    


 


  IV Fluids 995   700


 


    LR    700


 


  IVPB  100 100 


 


    PB - METHYLPREDNISOLONE  100 100 


 


  Oral 1320 1320 1080 0


 


Output:    


 


  Urine 250 300 400 0





 





ADLs: Meal  Record                                         Start:  19 22:

35


Freq:   DAILY@0900,1400,1800                               Status: Active      

  


Protocol:                                                                      

  


 Created      19 22:35  System  (Rec: 19 22:35  System  TELE-C07)


 Document     19 09:00  [id number]  (Rec: 19 10:19  [id number]  TELE-C07)


 Document     19 14:00  [id number]  (Rec: 19 15:10  [id number]  TELE-C07)


 Document     19 18:00  YRZ9654  (Rec: 19 18:59  [id number]  TELE-C07)


 Document     19 09:00  NIG6591  (Rec: 19 15:19  HIF7597  TELE-C05)


 Document     19 14:00  KHN5711  (Rec: 19 15:20  [id number]  TELE-C05)


 Document     19 18:00  BYI1458  (Rec: 19 18:10  [id number]  TELE-C05)


 Document     19 09:00  QLL0733  (Rec: 19 10:36  DTL1886  TELE-C05)


 Document     19 14:00  BQX1843  (Rec: 19 15:24  QNT2526  TELE-C05)


 Document     19 18:00  YRW8956  (Rec: 19 21:19  SDT2296  TELE-C05)


 Document     19 09:00  WIC5384  (Rec: 19 15:18  GYQ4184  TELE-C09)


 Document     19 14:00  FOO0662  (Rec: 19 15:19  LXH9952  TELE-C09)


 Document     19 18:00  UON0006  (Rec: 19 21:35  [id number]  TELE-C13)


Intake and Output                                          Start:  19 17:

05


Freq:                                                      Status: Cancelled   

  


Protocol:                                                                      

  


 Created      19 17:05  System  (Rec: 19 17:05  System  ED-C24)


Intake and Output                                          Start:  19 22:

35


Freq:   DAILY@0600,1400,2200                               Status: Cancelled   

  


Protocol:                                                                      

  


 Created      19 22:35  System  (Rec: 19 22:35  System  TELE-C07)


Intake and Output                                          Start:  19 22:

34


Freq:   06,14,2200                                         Status: Active      

  


Protocol:                                                                      

  


 Created      19 22:36  [id number]  (Rec: 19 22:36  BKG  SARAH-BG12)


 Document     19 06:00  HGF1216  (Rec: 19 06:12  [id number]  TELE-C05)


 Document     19 14:00  CNP9939  (Rec: 19 15:10  IJM4798  TELE-C07)


 Document     19 22:00  UMH4551  (Rec: 19 22:54  [id number]  TELE-C08)


 Document     19 06:00  ZWZ8436  (Rec: 19 08:09  DDD0465  TELE-C07)


 Document     19 14:00  OJR5655  (Rec: 19 15:20  JOG8957  TELE-C05)


 Document     19 22:00  PKW9436  (Rec: 19 22:48  PZJ1331  TELE-C05)


 Document     19 06:00  DAV0356  (Rec: 19 07:18  BDH2327  TELE-C05)


 Document     19 14:00  IOA9645  (Rec: 19 15:24  ATR8956  TELE-C05)


 Document     19 22:00  GMY8064  (Rec: 19 22:21  [id number]  TELE-C05)


 Document     19 06:00  BLK2375  (Rec: 19 07:17  YLS1323  TELE-C01)


 Document     19 14:00  FRQ0818  (Rec: 19 15:19  [id number]  TELE-C09)


 Document     19 21:36  PPJ3412  (Rec: 19 21:37  ZAI8417  TELE-C13)


 Document     19 05:43  JMQ1959  (Rec: 19 05:44  XNR2664  TELE-C13)











- Physical Exam


General: No Cyanosis, No Anemia, No Jaundice, No Clubbing


Lungs and Chest: Yes: Chest Expansion Full, Chest Expansion Symetrica, 

Percussion Note Resonant.  No: Vessicular Breath Sounds - dimished right mid 

zone and some patchy bronchial breathing, Crackles, Wheezes, Respiratory 

Distress, Use of Accessory Muscles


Heart Rate and Rhythm: Regular


Additional Cardiovascular: Yes: Normal Heart Sounds, Heart Murmur.  No: Pedal 

Edema


Abdominal Exam: Yes: Soft, Bowel Sounds Present.  No: Distention, Abdominal 

Tenderness





Results





- Results


Lab Results: 


 Laboratory Results - last 24 hr











  19





  06:16 20:05 05:53


 


POC Glucose (mg/dL)   


 


Total Protein (PEP)  6.3  


 


Albumin (PEP)  3.0 L  


 


Albumin/Globulin (PEP)  0.91  


 


Alpha-1-Globulins  0.4 H  


 


Alpha-2-Globulins  1.1 H  


 


Beta-1-Globulin  1.1  


 


Gamma Globulins  0.7  


 


M-Jhonny  Not Reportable  


 


M-Jhonny 2  Not Reportable  


 


PEP Impression  See comment  


 


Angiotensin Convert Enz   11 


 


IgA   183 


 


[place] Light Chain  4.21 H  


 


Lambda Light Chain  1.77  


 


Kappa/Lambda Ratio  2.38 H  


 


Histone Antibodies    <0.5














  19





  15:50 19:57 07:24


 


POC Glucose (mg/dL)  385 H  173 H  60 L


 


Total Protein (PEP)   


 


Albumin (PEP)   


 


Albumin/Globulin (PEP)   


 


Alpha-1-Globulins   


 


Alpha-2-Globulins   


 


Beta-1-Globulin   


 


Gamma Globulins   


 


M-Jhonny   


 


M-Jhonny 2   


 


PEP Impression   


 


Angiotensin Convert Enz   


 


IgA   


 


Kappa Light Chain   


 


Lambda Light Chain   


 


Kappa/Lambda Ratio   


 


Histone Antibodies   














  19





  07:29


 


POC Glucose (mg/dL)  62 L


 


Total Protein (PEP) 


 


Albumin (PEP) 


 


Albumin/Globulin (PEP) 


 


Alpha-1-Globulins 


 


Alpha-2-Globulins 


 


Beta-1-Globulin 


 


Gamma Globulins 


 


M-Jhonny 


 


M-Jhonny 2 


 


PEP Impression 


 


Angiotensin Convert Enz 


 


IgA 


 


Kappa Light Chain 


 


Lambda Light Chain 


 


Kappa/Lambda Ratio 


 


Histone Antibodies 











Radiology Results: 





Patient Name:         VERONICA GLASS                                   

                        Medical Record#: [id number]


Ordering Physician: Haris Hernandez MD                                                

                        Acct.#: [id number]


:     1955         Age: 64   Sex: M                                   

                        Location: [address] [place] [address]/TELEMETRY


Exam Date: 19                                                       

                        ADM Status: ADM IN


Order Information:                         CHEST AP OR PORT


Accession Number:                          [id number]


CPT:                                       11228


Indication: Crackles in the lung hypoxia.





Single frontal view of the chest performed at 0435 hours was reviewed.





Comparison is made with previous exam dated 2019.





Right middle lobe consolidation has progressed. Linear atelectasis in the left 

lung base.


This is consistent with pneumonia.





IMPRESSION: PROGRESSIVE PNEUMONIA IN THE RIGHT MIDDLE LOBE. NO PLEURAL FLUID IS


IDENTIFIED.








____________________________________________________________


<Electronically signed by Zohreh Melton MD in OV>  19 0758


Dictated By: Zohreh Melton MD


Dictated Date/Time: 19 0758


Transcribed Date/Time: 19 0757


Copy to:








Other Results/Reports: 














VERONICA GLASS                                                         

               1955                                 [id number]      




















VERONICA GLASS [id number]                  Page: 




















                           [place] [address]


 Ph.#: [phone]   Fax#: [phone]


 [place] # [id number]


 Navid Mcrae M.D. Director of Laboratories





 [facility] PATHOLOGY REPORT

















VERONICA GLASS [id number]                  Page: 

















Patient Name: VERONICA GLASS Pathology Number: [id number]


 


Med.Rec. #:  [id number] Collection Date: 19


 


Acct. Number#: [id number] Received Date: 19


 


: 1955 Location: [address] [place] [address]


 


Gender: M 


 


Submitting Physician: Rita Desouza MD                                        

                   


Other Physician: Haris Hernandez MD


Other Physician: Savannah Mandel MD


 

















 FINAL DIAGNOSIS








 


Bronchoalveolar lavage, right:


    --Negative for malignant cells.


   




















                                   Preliminary Signed by and Reported on: 

ABHIJEET Hanson(ASCP)    19   1428                                  


                                Electronically Signed by and Reported on: 

Navid Mcrae MD   19  1602











SPECIMEN(S) RECEIVED

















BRONCHIAL LAVAGE RIGHT - RIGHT BRONCHIAL LAVAGE.


 


CLINICAL HISTORY

















Assessment





- Problem List


Assessment: 


Patient Problems





Right middle lobe pulmonary infiltrate (Acute)


Status post bronchoscopy (Acute)


Acute renal failure (Acute)


Adverse drug effect (Acute)


Hemoptysis (Acute)


Pulmonary-renal syndrome (Acute)


Atherosclerosis (Chronic)


BMI over 35 (Chronic)


Diabetic neuropathy (Chronic)


Essential hypertension (Chronic)


Hyperlipidemia (Chronic)


Microalbuminuria due to type 2 diabetes mellitus (Chronic)


Type 2 diabetes mellitus (Chronic)








Plan: 





Right middle lobe pulmonary infiltrate (Acute) Status post bronchoscopy (Acute) 

Hemoptysis (Acute) His bronchoscopy is complicated by hemoptysis, dyspnea, 

hypoxemia and a right middle lobe infiltrate.  He has no signs of infection.  I 

have put in a call to Dr. Desouza for advice - I wonder if he will require 

antibacterials, or if this is secondary to hemorrhage.  There is no evidence of 

a pneumo or hemothorax.  


Pulmonary-renal syndrome (Acute) Acute renal failure (Acute) Adverse drug 

effect (Acute)  I will recheck his BMP


 


secondary diagnoses





Atherosclerosis (Chronic)


BMI over 35 (Chronic)


Diabetic neuropathy (Chronic)


Essential hypertension (Chronic)


Hyperlipidemia (Chronic)  stop atorvastatin due to myalgia


Microalbuminuria due to type 2 diabetes mellitus (Chronic)


Type 2 diabetes mellitus (Chronic)  I will adjust his therapy to prevent 

further hypoglycemia





I discussed the above with the patient. I explained I needed Dr. Desouza's 

advice before consideration of discharge.

## 2019-04-02 NOTE — PN
Progress Note





- Progress Note


Date of Service: 04/02/19 - Pulm f/u note


Note: 





Pt seen and examined at bedside. Overnight events noted. Pt had episodes of 

hemoptysis last night. he also required O2 supplementation, currently on 3L. 

Rpt CXR last night showed progression of air space opacity in RML that was seen 

on prior CXR post bronchoscopy.


Pt denies any further episodes of hemoptysis this am. O2 sats at 95% while 

lying down on 3L


 Active Medications











Generic Name Dose Route Start Last Admin





  Trade Name Freq  PRN Reason Stop Dose Admin


 


Acetaminophen  650 mg  03/28/19 22:30  





  Tylenol Tab*  PO   





  Q6H PRN   





  pain/fever   





     





     





     


 


Amlodipine Besylate  5 mg  03/29/19 03:00  04/02/19 08:06





  Norvasc Tab*  PO   5 mg





  BID MOISÉS   Administration





     





     





     





     


 


Aspirin  81 mg  03/29/19 09:00  04/02/19 08:05





  Aspirin Ec Tab*  PO   81 mg





  DAILY MOISÉS   Administration





     





     





     





     


 


Carvedilol  25 mg  03/29/19 03:00  04/02/19 08:06





  Coreg Tab*  PO   25 mg





  BID MOISÉS   Administration





     





     





     





     


 


Gabapentin  400 mg  03/29/19 21:00  04/01/19 20:20





  Neurontin Cap(*)  PO   400 mg





  BEDTIME MOISÉS   Administration





     





     





     





     


 


Gabapentin  300 mg  03/29/19 03:00  04/02/19 08:05





  Neurontin Cap(*)  PO   300 mg





  QAM MOISÉS   Administration





     





     





     





     


 


Guaifenesin/Codeine Phosphate  5 ml  04/01/19 17:23  04/01/19 17:59





  Robitussin Ac 100mg-10mg*  PO   5 ml





  Q4H PRN   Administration





  COUGH   





     





     





     


 


Insulin Glargine  20 units  04/02/19 10:00  04/02/19 10:26





  Lantus(*)  SUBCUT   20 unit





  Q24H MOISÉS   Administration





     





     





     





     


 


Insulin Human Lispro  10 units  04/02/19 11:30  04/02/19 11:47





  Humalog*  SUBCUT   10 units





  AC MOISÉS   Administration





     





     





     





     


 


Meclizine HCl  25 mg  03/28/19 22:28  





  Antivert Tab*  PO   





  TID PRN   





  VERTIGO   





     





     





     


 


Prochlorperazine Edisylate  5 mg  03/28/19 22:30  





  Compazine Inj*  IV   





  Q6H PRN   





  NAUSEA/VOMITING   





     





     





     


 


Temazepam  15 mg  03/30/19 11:08  03/31/19 21:30





  Restoril Cap*  PO   15 mg





  BEDTIME PRN   Administration





  INSOMNIA   





     





     





     








 Vital Signs











Temp Pulse Resp BP Pulse Ox


 


 97.5 F   63   18   127/67   96 


 


 04/02/19 08:07  04/02/19 08:07  04/02/19 10:27  04/02/19 08:07  04/02/19 08:07








O/E: Pt in NAD


HEENT: PERRLA, no JVD


Lungs: Good a/e b/l, scaterred wheeze+


CVS: S1, S2+


Abd: Obese, BS+


Ext: Normal ROM


Skin: No rash


Neuro: nO focal deficits





 Laboratory Results - last 24 hr











  03/29/19 03/29/19 03/30/19





  06:16 20:05 05:53


 


WBC   


 


RBC   


 


Hgb   


 


Hct   


 


MCV   


 


MCH   


 


MCHC   


 


RDW   


 


Plt Count   


 


MPV   


 


Neut % (Auto)   


 


Lymph % (Auto)   


 


Mono % (Auto)   


 


Eos % (Auto)   


 


Baso % (Auto)   


 


Absolute Neuts (auto)   


 


Absolute Lymphs (auto)   


 


Absolute Monos (auto)   


 


Absolute Eos (auto)   


 


Absolute Basos (auto)   


 


Absolute Nucleated RBC   


 


Nucleated RBC %   


 


Sodium   


 


Potassium   


 


Chloride   


 


Carbon Dioxide   


 


Anion Gap   


 


BUN   


 


Creatinine   


 


Est GFR ( Amer)   


 


Est GFR (Non-Af Amer)   


 


BUN/Creatinine Ratio   


 


Glucose   


 


POC Glucose (mg/dL)   


 


Calcium   


 


C-Reactive Protein   


 


IgA   183 


 


[place] Light Chain  4.21 H  


 


Lambda Light Chain  1.77  


 


Kappa/Lambda Ratio  2.38 H  


 


Anti-ds DNA IgG Ab   


 


Histone Antibodies    <0.5














  03/30/19 04/01/19 04/02/19





  05:53 19:57 07:24


 


WBC   


 


RBC   


 


Hgb   


 


Hct   


 


MCV   


 


MCH   


 


MCHC   


 


RDW   


 


Plt Count   


 


MPV   


 


Neut % (Auto)   


 


Lymph % (Auto)   


 


Mono % (Auto)   


 


Eos % (Auto)   


 


Baso % (Auto)   


 


Absolute Neuts (auto)   


 


Absolute Lymphs (auto)   


 


Absolute Monos (auto)   


 


Absolute Eos (auto)   


 


Absolute Basos (auto)   


 


Absolute Nucleated RBC   


 


Nucleated RBC %   


 


Sodium   


 


Potassium   


 


Chloride   


 


Carbon Dioxide   


 


Anion Gap   


 


BUN   


 


Creatinine   


 


Est GFR ( Amer)   


 


Est GFR (Non-Af Amer)   


 


BUN/Creatinine Ratio   


 


Glucose   


 


POC Glucose (mg/dL)   173 H  60 L


 


Calcium   


 


C-Reactive Protein   


 


IgA   


 


Kappa Light Chain   


 


Lambda Light Chain   


 


Kappa/Lambda Ratio   


 


Anti-ds DNA IgG Ab  <12.3  


 


Histone Antibodies   














  04/02/19 04/02/19 04/02/19





  07:29 08:28 09:53


 


WBC   


 


RBC   


 


Hgb   


 


Hct   


 


MCV   


 


MCH   


 


MCHC   


 


RDW   


 


Plt Count   


 


MPV   


 


Neut % (Auto)   


 


Lymph % (Auto)   


 


Mono % (Auto)   


 


Eos % (Auto)   


 


Baso % (Auto)   


 


Absolute Neuts (auto)   


 


Absolute Lymphs (auto)   


 


Absolute Monos (auto)   


 


Absolute Eos (auto)   


 


Absolute Basos (auto)   


 


Absolute Nucleated RBC   


 


Nucleated RBC %   


 


Sodium   143 


 


Potassium   4.1 


 


Chloride   110 


 


Carbon Dioxide   23 


 


Anion Gap   10 


 


BUN   77 H 


 


Creatinine   2.16 H 


 


Est GFR ( Amer)   37.3 


 


Est GFR (Non-Af Amer)   30.8 


 


BUN/Creatinine Ratio   35.6 H 


 


Glucose   103 H 


 


POC Glucose (mg/dL)  62 L   205 H


 


Calcium   9.2 


 


C-Reactive Protein   < 1.00 


 


IgA   


 


Kappa Light Chain   


 


Lambda Light Chain   


 


Kappa/Lambda Ratio   


 


Anti-ds DNA IgG Ab   


 


Histone Antibodies   














  04/02/19 04/02/19 04/02/19





  11:33 15:38 15:38


 


WBC   10.7 


 


RBC   4.40 


 


Hgb   13.9 L 


 


Hct   41 


 


MCV   94 


 


MCH   32 H 


 


MCHC   34 


 


RDW   15 


 


Plt Count   195 


 


MPV   9.8 


 


Neut % (Auto)   86.7 


 


Lymph % (Auto)   5.8 


 


Mono % (Auto)   7.0 


 


Eos % (Auto)   0.3 


 


Baso % (Auto)   0.2 


 


Absolute Neuts (auto)   9.2 H 


 


Absolute Lymphs (auto)   0.6 L 


 


Absolute Monos (auto)   0.7 


 


Absolute Eos (auto)   0 


 


Absolute Basos (auto)   0 


 


Absolute Nucleated RBC   0 


 


Nucleated RBC %   0 


 


Sodium    143


 


Potassium    4.0


 


Chloride    111


 


Carbon Dioxide    25


 


Anion Gap    7


 


BUN    78 H


 


Creatinine    2.38 H


 


Est GFR ( Amer)    33.4


 


Est GFR (Non-Af Amer)    27.6


 


BUN/Creatinine Ratio    32.8 H


 


Glucose    92


 


POC Glucose (mg/dL)  154 H  


 


Calcium    9.1


 


C-Reactive Protein   


 


IgA   


 


Kappa Light Chain   


 


Lambda Light Chain   


 


Kappa/Lambda Ratio   


 


Anti-ds DNA IgG Ab   


 


Histone Antibodies   














I/R: 64 y o obese m with h/o DM a/w hemoptysis, SOB, found ot have ARF and 

nodular opacities in lung. His sx started after he received nuclear material 

for stress test- ? sec to medication side effect versus coincidental


Pulmonary renal syndrome- Wegners was high in differential however ANCA within 

normal limits


CRP significantly elevated, normalized today. Renal function slightly improving


Received pulse dose steroids





Underwent bronchoscopy with transbronchial biopsy on right side. No bleeding 

noted during bronchoscopy. He also had BAL from RML, biopsies were done from 

RLL.


He didnot have any significant bleeding during biopsies.


Streaks of blood is expected after bronchoscopy and biopsy.


CXR with RML air space disease is likely sec to BAL


Hypoxia-? BAL fluid in Rt lung and V/Q mismatch


Encouraged pt to take deep breaths- incentive spirometry ordered


He does have mild wheeze on auscultation likely resulting from procedure itself

, heard after procedure as well


Also advised pt to ambulate as tolerated


Will reassess O2 requirements in am


Will order neb treatment

## 2019-04-03 VITALS — DIASTOLIC BLOOD PRESSURE: 84 MMHG | SYSTOLIC BLOOD PRESSURE: 160 MMHG

## 2019-04-03 RX ADMIN — INSULIN GLARGINE SCH UNIT: 100 INJECTION, SOLUTION SUBCUTANEOUS at 09:20

## 2019-04-03 RX ADMIN — INSULIN LISPRO SCH UNITS: 100 INJECTION, SOLUTION INTRAVENOUS; SUBCUTANEOUS at 09:19

## 2019-04-03 RX ADMIN — GABAPENTIN SCH MG: 300 CAPSULE ORAL at 09:21

## 2019-04-03 RX ADMIN — CARVEDILOL SCH MG: 25 TABLET, FILM COATED ORAL at 09:20

## 2019-04-03 RX ADMIN — ASPIRIN SCH MG: 81 TABLET, COATED ORAL at 09:20

## 2019-04-03 RX ADMIN — ALBUTEROL SULFATE SCH MG: 2.5 SOLUTION RESPIRATORY (INHALATION) at 08:02

## 2019-04-03 RX ADMIN — ALBUTEROL SULFATE SCH: 2.5 SOLUTION RESPIRATORY (INHALATION) at 01:32

## 2019-04-03 RX ADMIN — AMLODIPINE BESYLATE SCH MG: 5 TABLET ORAL at 09:21

## 2019-04-03 NOTE — PN
Progress Note





- Progress Note


Date of Service: 04/03/19 - Pulm f/u note


Note: 





Pt seen and examined at bedside. Pt reports feeling better. No acute events 

last night. Pt was titrated off O2 and remained on RA last night and this am. 

He reports still having some streaks of blood with phleghm, no significant 

hemoptysis. Bl sugars on lower end this am.


 Active Medications











Generic Name Dose Route Start Last Admin





  Trade Name Freq  PRN Reason Stop Dose Admin


 


Acetaminophen  650 mg  03/28/19 22:30  04/02/19 18:14





  Tylenol Tab*  PO   650 mg





  Q6H PRN   Administration





  pain/fever   





     





     





     


 


Albuterol  2.5 mg  04/02/19 19:00  04/03/19 08:02





  Ventolin 2.5 Mg/3 Ml Neb.Sol*  INH   2.5 mg





  [id number] AWAKE MOISÉS   Administration





     





     





     





     


 


Amlodipine Besylate  5 mg  03/29/19 03:00  04/02/19 19:44





  Norvasc Tab*  PO   5 mg





  BID MOISÉS   Administration





     





     





     





     


 


Aspirin  81 mg  03/29/19 09:00  04/02/19 08:05





  Aspirin Ec Tab*  PO   81 mg





  DAILY MOISÉS   Administration





     





     





     





     


 


Carvedilol  25 mg  03/29/19 03:00  04/02/19 19:44





  Coreg Tab*  PO   25 mg





  BID MOISÉS   Administration





     





     





     





     


 


Furosemide  20 mg  04/03/19 08:06  





  Lasix Tab*  PO  04/03/19 08:07  





  ONCE ONE   





     





     





     





     


 


Gabapentin  400 mg  03/29/19 21:00  04/02/19 19:44





  Neurontin Cap(*)  PO   400 mg





  BEDTIME MOISÉS   Administration





     





     





     





     


 


Gabapentin  300 mg  03/29/19 03:00  04/02/19 08:05





  Neurontin Cap(*)  PO   300 mg





  QAM MOISÉS   Administration





     





     





     





     


 


Guaifenesin/Codeine Phosphate  5 ml  04/01/19 17:23  04/02/19 18:09





  Robitussin Ac 100mg-10mg*  PO   5 ml





  Q4H PRN   Administration





  COUGH   





     





     





     


 


Insulin Glargine  20 units  04/02/19 10:00  04/02/19 10:26





  Lantus(*)  SUBCUT   20 unit





  Q24H MOISÉS   Administration





     





     





     





     


 


Insulin Human Lispro  10 units  04/02/19 11:30  04/02/19 16:25





  Humalog*  SUBCUT   Not Given





  AC MOISÉS   





     





     





     





     


 


Meclizine HCl  25 mg  03/28/19 22:28  





  Antivert Tab*  PO   





  TID PRN   





  VERTIGO   





     





     





     


 


Prochlorperazine Edisylate  5 mg  03/28/19 22:30  





  Compazine Inj*  IV   





  Q6H PRN   





  NAUSEA/VOMITING   





     





     





     


 


Temazepam  15 mg  03/30/19 11:08  03/31/19 21:30





  Restoril Cap*  PO   15 mg





  BEDTIME PRN   Administration





  INSOMNIA   





     





     





     








 Vital Signs











Temp Pulse Resp BP Pulse Ox


 


 98.5 F   75   18   168/82   95 


 


 04/03/19 03:34  04/03/19 08:04  04/03/19 08:04  04/03/19 05:28  04/03/19 08:04








O/E: Pt in NAD, lying in bed


HEENT: PERRLA, no JVD


Lungs: Good a/e b/l, crackles + b/l


CVS: S1, S2+, regular


Abd: Obese, BS+, NT


Ext: Normal ROM, no edema


Skin: No rash


Neuro: nO focal deficits


 Laboratory Results - last 24 hr











  03/29/19 03/30/19 04/01/19





  20:05 05:53 10:54


 


WBC   


 


RBC   


 


Hgb   


 


Hct   


 


MCV   


 


MCH   


 


MCHC   


 


RDW   


 


Plt Count   


 


MPV   


 


Neut % (Auto)   


 


Lymph % (Auto)   


 


Mono % (Auto)   


 


Eos % (Auto)   


 


Baso % (Auto)   


 


Absolute Neuts (auto)   


 


Absolute Lymphs (auto)   


 


Absolute Monos (auto)   


 


Absolute Eos (auto)   


 


Absolute Basos (auto)   


 


Absolute Nucleated RBC   


 


Nucleated RBC %   


 


Sodium   


 


Potassium   


 


Chloride   


 


Carbon Dioxide   


 


Anion Gap   


 


BUN   


 


Creatinine   


 


Est GFR ( Amer)   


 


Est GFR (Non-Af Amer)   


 


BUN/Creatinine Ratio   


 


Glucose   


 


POC Glucose (mg/dL)    222 H


 


Calcium   


 


C-Reactive Protein   


 


IgE  28.2  


 


Anti-ds DNA IgG Ab   <12.3 














  04/02/19 04/02/19 04/02/19





  08:28 09:53 11:33


 


WBC   


 


RBC   


 


Hgb   


 


Hct   


 


MCV   


 


MCH   


 


MCHC   


 


RDW   


 


Plt Count   


 


MPV   


 


Neut % (Auto)   


 


Lymph % (Auto)   


 


Mono % (Auto)   


 


Eos % (Auto)   


 


Baso % (Auto)   


 


Absolute Neuts (auto)   


 


Absolute Lymphs (auto)   


 


Absolute Monos (auto)   


 


Absolute Eos (auto)   


 


Absolute Basos (auto)   


 


Absolute Nucleated RBC   


 


Nucleated RBC %   


 


Sodium  143  


 


Potassium  4.1  


 


Chloride  110  


 


Carbon Dioxide  23  


 


Anion Gap  10  


 


BUN  77 H  


 


Creatinine  2.16 H  


 


Est GFR ( Amer)  37.3  


 


Est GFR (Non-Af Amer)  30.8  


 


BUN/Creatinine Ratio  35.6 H  


 


Glucose  103 H  


 


POC Glucose (mg/dL)   205 H  154 H


 


Calcium  9.2  


 


C-Reactive Protein  < 1.00  


 


IgE   


 


Anti-ds DNA IgG Ab   














  04/02/19 04/02/19 04/02/19





  15:38 15:38 16:20


 


WBC  10.7  


 


RBC  4.40  


 


Hgb  13.9 L  


 


Hct  41  


 


MCV  94  


 


MCH  32 H  


 


MCHC  34  


 


RDW  15  


 


Plt Count  195  


 


MPV  9.8  


 


Neut % (Auto)  86.7  


 


Lymph % (Auto)  5.8  


 


Mono % (Auto)  7.0  


 


Eos % (Auto)  0.3  


 


Baso % (Auto)  0.2  


 


Absolute Neuts (auto)  9.2 H  


 


Absolute Lymphs (auto)  0.6 L  


 


Absolute Monos (auto)  0.7  


 


Absolute Eos (auto)  0  


 


Absolute Basos (auto)  0  


 


Absolute Nucleated RBC  0  


 


Nucleated RBC %  0  


 


Sodium   143 


 


Potassium   4.0 


 


Chloride   111 


 


Carbon Dioxide   25 


 


Anion Gap   7 


 


BUN   78 H 


 


Creatinine   2.38 H 


 


Est GFR ( Amer)   33.4 


 


Est GFR (Non-Af Amer)   27.6 


 


BUN/Creatinine Ratio   32.8 H 


 


Glucose   92 


 


POC Glucose (mg/dL)    87


 


Calcium   9.1 


 


C-Reactive Protein   


 


IgE   


 


Anti-ds DNA IgG Ab   














  04/02/19 04/03/19





  19:39 07:10


 


WBC  


 


RBC  


 


Hgb  


 


Hct  


 


MCV  


 


MCH  


 


MCHC  


 


RDW  


 


Plt Count  


 


MPV  


 


Neut % (Auto)  


 


Lymph % (Auto)  


 


Mono % (Auto)  


 


Eos % (Auto)  


 


Baso % (Auto)  


 


Absolute Neuts (auto)  


 


Absolute Lymphs (auto)  


 


Absolute Monos (auto)  


 


Absolute Eos (auto)  


 


Absolute Basos (auto)  


 


Absolute Nucleated RBC  


 


Nucleated RBC %  


 


Sodium  


 


Potassium  


 


Chloride  


 


Carbon Dioxide  


 


Anion Gap  


 


BUN  


 


Creatinine  


 


Est GFR ( Amer)  


 


Est GFR (Non-Af Amer)  


 


BUN/Creatinine Ratio  


 


Glucose  


 


POC Glucose (mg/dL)  149 H  53 L


 


Calcium  


 


C-Reactive Protein  


 


IgE  


 


Anti-ds DNA IgG Ab  




















I/R: 64 y o obese m with h/o DM a/w hemoptysis, SOB, found ot have ARF and 

nodular opacities in lung. His sx started after he received nuclear material 

for stress test- ? sec to medication side effect versus coincidental


Initial presentation was suggestive of pulmonary renal syndrome- Wegners was 

high in differential however ANCA within normal limits


CRP significantly elevated on admission, normalized after pulse dose steroids. 

Renal function slightly improving


Received pulse dose steroids





Underwent bronchoscopy with transbronchial biopsy on right side. No bleeding 

noted during bronchoscopy. He also had BAL from RML, biopsies were done from 

[facility].


He didnot have any significant bleeding during biopsies.


Streaks of blood is expected after bronchoscopy and biopsy.


CXR with [facility] air space disease is likely sec to BAL


Hypoxia-? BAL fluid in Rt lung and V/Q mismatch, improving


c/w incentive spirometry ordered


He does have crackles b/l which could be from fluid overload, would recommend 

low dose Lasix


Also advised pt to ambulate as tolerated


For d/c home today


Will f/u as out pt if any resp concerns





D/w Dr Hernandez

## 2019-04-04 ENCOUNTER — HOSPITAL ENCOUNTER (EMERGENCY)
Dept: HOSPITAL 25 - ED | Age: 64
Discharge: HOME | End: 2019-04-04
Payer: MEDICARE

## 2019-04-04 VITALS — SYSTOLIC BLOOD PRESSURE: 162 MMHG | DIASTOLIC BLOOD PRESSURE: 82 MMHG

## 2019-04-04 DIAGNOSIS — J18.9: ICD-10-CM

## 2019-04-04 DIAGNOSIS — I10: ICD-10-CM

## 2019-04-04 DIAGNOSIS — I25.10: ICD-10-CM

## 2019-04-04 DIAGNOSIS — E11.9: ICD-10-CM

## 2019-04-04 DIAGNOSIS — I25.2: ICD-10-CM

## 2019-04-04 DIAGNOSIS — R04.2: Primary | ICD-10-CM

## 2019-04-04 DIAGNOSIS — Z87.891: ICD-10-CM

## 2019-04-04 DIAGNOSIS — E78.00: ICD-10-CM

## 2019-04-04 PROCEDURE — 71046 X-RAY EXAM CHEST 2 VIEWS: CPT

## 2019-04-04 PROCEDURE — 99283 EMERGENCY DEPT VISIT LOW MDM: CPT

## 2019-04-04 NOTE — ED
Respiratory





- HPI Summary


HPI Summary: 


Patient is a 64-year-old male who was discharged from hospital yesterday 

presenting to the ED with concern for hemoptysis.  Patient states he originally 

came into the hospital for hemoptysis and subsequently had biopsies done as 

well as bronchoscopies.  Upon discharge he was still having streaks of blood 

with coughing.  Was also noted in the note by Dr. Desouza, the post procedure it 

is normal to have streaks of blood.  Patient states he was unsure if it was 

normal to still have this, and this is why he came in today.








- History of Current Complaint


Chief Complaint: EDGeneral


Stated Complaint: SPITTING UP BLOOD PER PT


Time Seen by Provider: 04/04/19 09:37


Hx Obtained From: Patient


Onset/Duration: Sudden Onset


Timing: Constant


Initial Severity: Moderate


Current Severity: Moderate


Pain Intensity: 0


Character: Cough (Productive) - phlegm with blood


Sputum Amount: Scant


Sputum Color: Red (Blood), Red Specks


Aggravating Factor(s): Nothing


Alleviating Factor(s): Nothing


Associated Signs and Symptoms: Negative





- Risk Factors


Pulmonary Embolism Risk Factors: Recent Surgery - recent biopsy with 

bronchoscopy


Cardiac Risk Factors: Negative


Pseudomonas Risk Factors: Negative


Tuberculosis Risk Factors: Negative





- Allergy/Home Medications


Allergies/Adverse Reactions: 


 Allergies











Allergy/AdvReac Type Severity Reaction Status Date / Time


 


atorvastatin AdvReac Intermediate Leg Cramps Verified 04/04/19 09:44


 


lisinopril AdvReac Intermediate Dizziness Verified 04/04/19 09:26














PMH/Surg Hx/FS Hx/Imm Hx


Previously Healthy: No


Endocrine/Hematology History: Reports: Hx Diabetes


   Denies: Hx Anemia


Cardiovascular History: Reports: Hx Angina, Hx Coronary Artery Disease, Hx 

Hypercholesterolemia, Hx Hypertension, Hx Myocardial Infarction


   Denies: Hx Pacemaker/ICD, Hx Valvular Heart Disease


Respiratory History: 


   Denies: Hx Asthma, Hx Chronic Obstructive Pulmonary Disease (COPD)


GI History: 


   Denies: Hx Jaundice


 History: 


   Denies: Hx Chronic Renal Failure, Hx Renal Disease


Sensory History: Reports: Hx Contacts or Glasses


   Denies: Hx Hearing Aid


Opthamlomology History: Reports: Hx Contacts or Glasses


Neurological History: Reports: Other Neuro Impairments/Disorders - PERIPHERAL 

NEUROPATHY FEET


   Denies: Hx Headaches


Psychiatric History: 


   Denies: Hx Panic Disorder





- Cancer History


Cancer Type, Location and Year: basal cell CHEST removed,


Hx Chemotherapy: No


Hx Radiation Therapy: No





- Surgical History


Surgery Procedure, Year, and Place: hernia repair, cardiac stent-CORDIS CYPHER 

2005: 3T SAFE, BONE RESET, VASECTOMY


Hx Anesthesia Reactions: No





- Immunization History


Date of Influenza Vaccine: 09/2018


Hx Pertussis Vaccination: No


Immunizations Up to Date: Yes


Infectious Disease History: No


Infectious Disease History: 


   Denies: Traveled Outside the [place] in Last 30 Days





- Social History


Occupation: Unemployed


Lives: Alone


Alcohol Use: States he drinks 3 times a week


Hx Substance Use: No


Substance Use Type: Reports: None


Substance Use Comment - Amount & Last Used: QUIT DRINKING 1/11/16


Smoking Status (MU): Former Smoker


Length of Time of Smoking/Using Tobacco: 30 YRS


Have You Smoked in the Last Year: No





Review of Systems


Constitutional: Negative


Negative: Fever, Chills, Fatigue, Skin Diaphoresis


Negative: Palpitations, Chest Pain


Positive: Cough - hemoptysis.  Negative: Shortness Of Breath


Negative: Abdominal Pain, Vomiting, Diarrhea, Nausea


Genitourinary: Negative


Positive: no symptoms reported, see HPI


Musculoskeletal: Negative


Neurological: Negative


All Other Systems Reviewed And Are Negative: Yes





Physical Exam


Triage Information Reviewed: Yes


Vital Signs On Initial Exam: 


 Initial Vitals











Temp Pulse Resp BP Pulse Ox


 


 98.2 F   80   16   178/92   92 


 


 04/04/19 09:23  04/04/19 09:23  04/04/19 09:23  04/04/19 09:23  04/04/19 09:23











Vital Signs Reviewed: Yes


Appearance: Positive: Well-Appearing, Well-Nourished


Skin: Positive: Warm, Skin Color Reflects Adequate Perfusion


Head/Face: Positive: Normal Head/Face Inspection


Eyes: Positive: EOMI, Conjunctiva Clear


Neck: Positive: Supple, Nontender - A, No Lymphadenopathy


Respiratory/Lung Sounds: Positive: Rhonchi - bilaterally


Cardiovascular: Positive: RRR, Pulses are Symmetrical in both Upper and Lower 

Extremities


Musculoskeletal: Positive: Normal, Strength/ROM Intact


Neurological: Positive: Speech Normal


Psychiatric: Positive: Normal, Affect/Mood Appropriate


AVPU Assessment: Alert





Diagnostics





- Vital Signs


 Vital Signs











  Temp Pulse Resp BP Pulse Ox


 


 04/04/19 10:16    12  156/84 


 


 04/04/19 10:01   71  11   93


 


 04/04/19 09:46   73  13  155/88  92


 


 04/04/19 09:23  98.2 F  80  16  178/92  92














- Laboratory


Lab Statement: Any lab studies that have been ordered have been reviewed, and 

results considered in the medical decision making process.





Disposition





- Course


Course Of Treatment: During his course of treatment, the patient is evaluated 

for hemoptysis.  He states this is been present for a very long time, however 

she has continued after his biopsies 2 days ago.  He states the hemoptysis 

stopped on the day just before he was discharged from hospital yesterday, 

however returned this morning and was concerned over this.  He states he is 

unsure if this was normal for postprocedure.  Discussed case with Dr. Desouza 

who agrees this is normal postprocedure as long as it is not and heavy amounts.

  She suggests a repeat chest x-ray.  Chest x-ray completed:  right lower lobe 

infiltrate, unchanged.  Discussed case with Dr. Desouza again who recommends 

levaquin.  Rx sent.





- Differential Dx - Cardiopulmonary


Differential Diagnoses - Cardiopulmonary: Other - Postprocedure hemoptysis





- Diagnoses


Provider Diagnoses: 


 Hemoptysis, Pneumonia








- Physician Notifications


Discussed Care Of Patient With: Rita Desouza - requested xray





Discharge





- Sign-Out/Discharge


Documenting (check all that apply): Patient Departure


Patient Received Moderate/Deep Sedation with Procedure: No





- Discharge Plan


Condition: Stable


Disposition: HOME


Prescriptions: 


Levofloxacin TAB* [Levaquin TAB*] 750 mg PO DAILY #5 tab


Patient Education Materials:  Hemoptysis (ED), Bacterial Pneumonia (ED)


Referrals: 


Haris Hernandez MD [Primary Care Provider] - 


Additional Instructions: 


As discussed, coughing up some blood is very normal after having this type of 

procedure


Levaquin daily x 5 days


Follow up with Dr. Hernandez tomorrow as planned





- Billing Disposition and Condition


Condition: STABLE


Disposition: Home

## 2019-04-08 LAB
ALBUMIN UR ELPH-MCNC: 49 %
ALBUMIN/GLOB UR ELPH: 0.95 %
GAMMA GLOB UR ELPH-MCNC: 14 %
PROT UR-MCNC: 32 MG/DL